# Patient Record
Sex: FEMALE | Race: OTHER | HISPANIC OR LATINO
[De-identification: names, ages, dates, MRNs, and addresses within clinical notes are randomized per-mention and may not be internally consistent; named-entity substitution may affect disease eponyms.]

---

## 2022-11-04 PROBLEM — Z00.00 ENCOUNTER FOR PREVENTIVE HEALTH EXAMINATION: Status: ACTIVE | Noted: 2022-11-04

## 2022-11-07 ENCOUNTER — APPOINTMENT (OUTPATIENT)
Dept: OBGYN | Facility: CLINIC | Age: 32
End: 2022-11-07

## 2022-11-07 VITALS
WEIGHT: 191.5 LBS | BODY MASS INDEX: 36.15 KG/M2 | HEIGHT: 61 IN | SYSTOLIC BLOOD PRESSURE: 108 MMHG | DIASTOLIC BLOOD PRESSURE: 70 MMHG

## 2022-11-07 DIAGNOSIS — Z78.9 OTHER SPECIFIED HEALTH STATUS: ICD-10-CM

## 2022-11-07 DIAGNOSIS — Z86.018 PERSONAL HISTORY OF OTHER BENIGN NEOPLASM: ICD-10-CM

## 2022-11-07 DIAGNOSIS — Z82.49 FAMILY HISTORY OF ISCHEMIC HEART DISEASE AND OTHER DISEASES OF THE CIRCULATORY SYSTEM: ICD-10-CM

## 2022-11-07 DIAGNOSIS — Z83.3 FAMILY HISTORY OF DIABETES MELLITUS: ICD-10-CM

## 2022-11-07 PROCEDURE — 99202 OFFICE O/P NEW SF 15 MIN: CPT | Mod: 25

## 2022-11-07 PROCEDURE — 81025 URINE PREGNANCY TEST: CPT

## 2022-11-07 PROCEDURE — 99385 PREV VISIT NEW AGE 18-39: CPT

## 2022-11-07 NOTE — HISTORY OF PRESENT ILLNESS
[Oral Contraceptive] : uses oral contraception pills [Y] : Patient is sexually active [Frequency: Q ___ days] : menstrual periods occur approximately every [unfilled] days [Menarche Age: ____] : age at menarche was [unfilled] [FreeTextEntry1] : 33yo  presents for pregnancy confirmation. Due for annual GYN exam\par LMP 2022 unsure of the exact date of LMP\par \par Obhx: \par -Primary c/s  2/2 abnormal FHT (nuchal x3), Male 7bls 11oz, no complications- [facility]\par  repeat c/s, 39 weeks, Male 8bls 11oz, no complications- [facility]\par \par GYNhx: fibriod\par \par Desires BTL with this repeat C/S\par  [PGHxTotal] : 3 [BannerxFullTerm] : 2 [PGHxPremature] : 0 [PGHxAbortions] : 0 [[facility] HospitalxLiving] : 2 [PGHxABInduced] : 0 [PGHxABSpont] : 0 [PGHxEctopic] : 0 [PGHxMultBirths] : 0

## 2022-11-07 NOTE — PROCEDURE
[Cervical Pap Smear] : cervical Pap smear [Liquid Base] : liquid base [GC & Chlamydia via Pap] : GC & Chlamydia via Pap [Tolerated Well] : the patient tolerated the procedure well [No Complications] : there were no complications [Transvaginal OB Sonogram] : Transvaginal OB Sonogram [Intrauterine Pregnancy] : intrauterine pregnancy [Yolk Sac] : yolk sac present [Fetal Heart] : fetal heart present [Date: ___] : Date: [unfilled] [Current GA by Sonogram: ___ (wks)] : Current GA by Sonogram: [unfilled]Uwks [___ day(s)] : [unfilled] days [Transvaginal OB Sonogram WNL] : Transvaginal OB Sonogram WNL [FreeTextEntry1] : IUP at 9w6d gestation by bedside sono\par POSITIVE FHR\par \par NT sono referral given for MFM office\par

## 2022-11-07 NOTE — PLAN
[FreeTextEntry1] : PLAN:\par \par IUP at 9w6d gestation by bedside sono, positive FHR\par \par Referral given for NT sono and 1st trimester genetic screening to be done at [facility]\par \par Prenatals sent\par Miscarriage precautions discussed\par \par PAP smear w/ HPV reflex obtained\par \par RTO 2-3 weeks for NPN\par

## 2022-11-08 LAB
C TRACH RRNA SPEC QL NAA+PROBE: NOT DETECTED
HPV HIGH+LOW RISK DNA PNL CVX: NOT DETECTED
N GONORRHOEA RRNA SPEC QL NAA+PROBE: NOT DETECTED
SOURCE TP AMPLIFICATION: NORMAL

## 2022-11-14 LAB — CYTOLOGY CVX/VAG DOC THIN PREP: NORMAL

## 2022-11-16 ENCOUNTER — ASOB RESULT (OUTPATIENT)
Age: 32
End: 2022-11-16

## 2022-11-16 ENCOUNTER — APPOINTMENT (OUTPATIENT)
Dept: MATERNAL FETAL MEDICINE | Facility: CLINIC | Age: 32
End: 2022-11-16

## 2022-11-16 PROCEDURE — 99202 OFFICE O/P NEW SF 15 MIN: CPT | Mod: 95

## 2022-11-20 ENCOUNTER — NON-APPOINTMENT (OUTPATIENT)
Age: 32
End: 2022-11-20

## 2022-11-21 ENCOUNTER — NON-APPOINTMENT (OUTPATIENT)
Age: 32
End: 2022-11-21

## 2022-11-21 ENCOUNTER — APPOINTMENT (OUTPATIENT)
Dept: OBGYN | Facility: CLINIC | Age: 32
End: 2022-11-21

## 2022-11-21 VITALS
DIASTOLIC BLOOD PRESSURE: 78 MMHG | SYSTOLIC BLOOD PRESSURE: 108 MMHG | WEIGHT: 188.06 LBS | HEIGHT: 61 IN | BODY MASS INDEX: 35.5 KG/M2

## 2022-11-21 DIAGNOSIS — Z32.01 ENCOUNTER FOR PREGNANCY TEST, RESULT POSITIVE: ICD-10-CM

## 2022-11-21 PROCEDURE — 0500F INITIAL PRENATAL CARE VISIT: CPT

## 2022-11-21 PROCEDURE — 36415 COLL VENOUS BLD VENIPUNCTURE: CPT

## 2022-11-22 LAB
ABO + RH PNL BLD: NORMAL
ALBUMIN SERPL ELPH-MCNC: 3.8 G/DL
ALP BLD-CCNC: 70 U/L
ALT SERPL-CCNC: 10 U/L
ANION GAP SERPL CALC-SCNC: 17 MMOL/L
APPEARANCE: CLEAR
AST SERPL-CCNC: 13 U/L
BASOPHILS # BLD AUTO: 0.04 K/UL
BASOPHILS NFR BLD AUTO: 0.4 %
BILIRUB SERPL-MCNC: 0.3 MG/DL
BILIRUBIN URINE: NEGATIVE
BLD GP AB SCN SERPL QL: NORMAL
BLOOD URINE: NEGATIVE
BUN SERPL-MCNC: 9 MG/DL
CALCIUM SERPL-MCNC: 8.9 MG/DL
CHLORIDE SERPL-SCNC: 99 MMOL/L
CMV IGG SERPL QL: 5.7 U/ML
CMV IGG SERPL-IMP: POSITIVE
CMV IGM SERPL QL: <8 AU/ML
CMV IGM SERPL QL: NEGATIVE
CO2 SERPL-SCNC: 19 MMOL/L
COLOR: YELLOW
CREAT SERPL-MCNC: 0.59 MG/DL
EGFR: 123 ML/MIN/1.73M2
EOSINOPHIL # BLD AUTO: 0.13 K/UL
EOSINOPHIL NFR BLD AUTO: 1.4 %
ESTIMATED AVERAGE GLUCOSE: 105 MG/DL
GLUCOSE QUALITATIVE U: NEGATIVE
GLUCOSE SERPL-MCNC: 46 MG/DL
HBA1C MFR BLD HPLC: 5.3 %
HBV SURFACE AG SER QL: NONREACTIVE
HCT VFR BLD CALC: 41 %
HCV AB SER QL: NONREACTIVE
HCV S/CO RATIO: 0.15 S/CO
HGB BLD-MCNC: 12.6 G/DL
HIV1+2 AB SPEC QL IA.RAPID: NONREACTIVE
IMM GRANULOCYTES NFR BLD AUTO: 0.3 %
KETONES URINE: NEGATIVE
LEUKOCYTE ESTERASE URINE: NEGATIVE
LYMPHOCYTES # BLD AUTO: 2.12 K/UL
LYMPHOCYTES NFR BLD AUTO: 22.5 %
MAN DIFF?: NORMAL
MCHC RBC-ENTMCNC: 28.4 PG
MCHC RBC-ENTMCNC: 30.7 GM/DL
MCV RBC AUTO: 92.6 FL
MONOCYTES # BLD AUTO: 0.62 K/UL
MONOCYTES NFR BLD AUTO: 6.6 %
MUV AB SER-ACNC: POSITIVE
MUV IGG SER QL IA: 112 AU/ML
NEUTROPHILS # BLD AUTO: 6.5 K/UL
NEUTROPHILS NFR BLD AUTO: 68.8 %
NITRITE URINE: NEGATIVE
PH URINE: 6
PLATELET # BLD AUTO: 401 K/UL
POTASSIUM SERPL-SCNC: 4.3 MMOL/L
PROT SERPL-MCNC: 6.6 G/DL
PROTEIN URINE: NORMAL
RBC # BLD: 4.43 M/UL
RBC # FLD: 13.3 %
RUBV IGG FLD-ACNC: 2.5 INDEX
RUBV IGG SER-IMP: POSITIVE
SODIUM SERPL-SCNC: 135 MMOL/L
SPECIFIC GRAVITY URINE: 1.03
T GONDII AB SER-IMP: NEGATIVE
T GONDII AB SER-IMP: POSITIVE
T GONDII IGG SER QL: 16.9 IU/ML
T GONDII IGM SER QL: 5.1 AU/ML
T PALLIDUM AB SER QL IA: NEGATIVE
TSH SERPL-ACNC: 0.86 UIU/ML
UROBILINOGEN URINE: NORMAL
VZV AB TITR SER: POSITIVE
VZV IGG SER IF-ACNC: 1019 INDEX
WBC # FLD AUTO: 9.44 K/UL

## 2022-11-23 ENCOUNTER — APPOINTMENT (OUTPATIENT)
Dept: ANTEPARTUM | Facility: CLINIC | Age: 32
End: 2022-11-23

## 2022-11-23 ENCOUNTER — ASOB RESULT (OUTPATIENT)
Age: 32
End: 2022-11-23

## 2022-11-23 LAB
MEV IGG FLD QL IA: 16.1 AU/ML
MEV IGG+IGM SER-IMP: NORMAL

## 2022-11-23 PROCEDURE — 76813 OB US NUCHAL MEAS 1 GEST: CPT

## 2022-11-23 PROCEDURE — 36415 COLL VENOUS BLD VENIPUNCTURE: CPT

## 2022-11-28 ENCOUNTER — NON-APPOINTMENT (OUTPATIENT)
Age: 32
End: 2022-11-28

## 2022-11-28 LAB
FMR1 GENE MUT ANL BLD/T: NORMAL
HGB A MFR BLD: 97.6 %
HGB A2 MFR BLD: 2.4 %
HGB FRACT BLD-IMP: NORMAL
LEAD BLD-MCNC: <1 UG/DL
M TB IFN-G BLD-IMP: NEGATIVE
QUANTIFERON TB PLUS MITOGEN MINUS NIL: 7.39 IU/ML
QUANTIFERON TB PLUS NIL: 0.06 IU/ML
QUANTIFERON TB PLUS TB1 MINUS NIL: -0.02 IU/ML
QUANTIFERON TB PLUS TB2 MINUS NIL: -0.03 IU/ML

## 2022-11-30 LAB
ADDITIONAL US: NORMAL
AR GENE MUT ANL BLD/T: NORMAL
B19V IGG SER QL IA: 4.92 INDEX
B19V IGG+IGM SER-IMP: NORMAL
B19V IGG+IGM SER-IMP: POSITIVE
B19V IGM FLD-ACNC: 0.17 INDEX
B19V IGM SER-ACNC: NEGATIVE
COMMENTS: AFP: NORMAL
CRL SCAN TWIN B: NORMAL
CRL SCAN: NORMAL
CROWN RUMP LENGTH TWIN B: NORMAL
CROWN RUMP LENGTH: 60.8 MM
DOWN SYNDROME AGE RISK: NORMAL
DOWN SYNDROME INTERPRETATION: NORMAL
DOWN SYNDROME SCREENING RISK: NORMAL
GEST. AGE ON COLLECTION DATE: 12.4 WEEKS
HCG MOM: 0.64
HCG VALUE: 55.1 IU/ML
MATERNAL AGE AT EDD: 33.4 YR
NOTE: AFP: NORMAL
NT MOM TWIN B: NORMAL
NT TWIN B: NORMAL
NUCHAL TRANSLUCENCY (NT): 1.7 MM
NUCHAL TRANSLUCENCY MOM: 1.28
NUMBER OF FETUSES: 1
PAPP-A MOM: 2.87
PAPP-A VALUE: 2021.5 NG/ML
RACE: NORMAL
RESULTS AFP: NORMAL
SONOGRAPHER ID#: NORMAL
SUBMIT PART 2 SAMPLE USING: NORMAL
TEST RESULTS: AFP: NORMAL
TRISOMY 18 AGE RISK: NORMAL
TRISOMY 18 INTERPRETATION: NORMAL
TRISOMY 18 SCREENING RISK: NORMAL
WEIGHT AFP: 190 LBS

## 2022-12-06 LAB — CFTR MUT TESTED BLD/T: NEGATIVE

## 2022-12-13 ENCOUNTER — APPOINTMENT (OUTPATIENT)
Dept: OBGYN | Facility: CLINIC | Age: 32
End: 2022-12-13
Payer: COMMERCIAL

## 2022-12-13 VITALS
BODY MASS INDEX: 36.93 KG/M2 | HEIGHT: 61 IN | DIASTOLIC BLOOD PRESSURE: 70 MMHG | WEIGHT: 195.6 LBS | SYSTOLIC BLOOD PRESSURE: 110 MMHG

## 2022-12-13 PROCEDURE — 0502F SUBSEQUENT PRENATAL CARE: CPT

## 2022-12-13 PROCEDURE — 99213 OFFICE O/P EST LOW 20 MIN: CPT

## 2022-12-13 RX ORDER — TERCONAZOLE 4 MG/G
0.4 CREAM VAGINAL DAILY
Qty: 1 | Refills: 3 | Status: ACTIVE | COMMUNITY
Start: 2022-12-13 | End: 1900-01-01

## 2022-12-23 ENCOUNTER — APPOINTMENT (OUTPATIENT)
Dept: ANTEPARTUM | Facility: CLINIC | Age: 32
End: 2022-12-23

## 2022-12-23 PROCEDURE — 36415 COLL VENOUS BLD VENIPUNCTURE: CPT

## 2022-12-28 DIAGNOSIS — Z3A.15 15 WEEKS GESTATION OF PREGNANCY: ICD-10-CM

## 2022-12-28 DIAGNOSIS — Z3A.11 11 WEEKS GESTATION OF PREGNANCY: ICD-10-CM

## 2022-12-31 LAB
ADDITIONAL US: NORMAL
AFP MOM: 1.02
AFP VALUE: 30.4 NG/ML
COLLECTED ON 2: NORMAL
COLLECTED ON: NORMAL
CRL SCAN TWIN B: NORMAL
CRL SCAN: NORMAL
CROWN RUMP LENGTH TWIN B: NORMAL
CROWN RUMP LENGTH: 60.8 MM
DIA MOM: 0.88
DIA VALUE: 117.8 PG/ML
DOWN SYNDROME AGE RISK: NORMAL
DOWN SYNDROME INTERPRETATION: NORMAL
DOWN SYNDROME SCREENING RISK: NORMAL
FIRST TRIMESTER SAMPLE: NORMAL
GEST. AGE ON COLLECTION DATE: 12.4 WEEKS
GESTATIONAL AGE: 16.7 WEEKS
HCG MOM: 0.68
HCG VALUE: 18.9 IU/ML
INSULIN DEP DIABETES: NO
MATERNAL AGE AT EDD: 33.4 YR
NT MOM TWIN B: NORMAL
NT TWIN B: NORMAL
NUCHAL TRANSLUCENCY (NT): 1.7 MM
NUCHAL TRANSLUCENCY MOM: 1.28
NUMBER OF FETUSES: 1
OPEN SPINA BIFIDA: NORMAL
OSB INTERPRETATION: NORMAL
PAPP-A MOM: 2.87
PAPP-A VALUE: 2021.5 NG/ML
RACE: NORMAL
SECOND TRIMESTER SAMPLE: NORMAL
SEQUENTIAL 2 COMMENTS: NORMAL
SEQUENTIAL 2 NOTE: NORMAL
SEQUENTIAL 2 RESULTS: NORMAL
SEQUENTIAL 2 TEST RESULTS: NORMAL
SONOGRAPHER ID#: NORMAL
TRISOMY 18 AGE RISK: NORMAL
TRISOMY 18 INTERPRETATION: NORMAL
TRISOMY 18 SCREENING RISK: NORMAL
UE3 MOM: 1.5
UE3 VALUE: 1.55 NG/ML
WEIGHT AFP: 190 LBS
WEIGHT: 195 LBS

## 2023-01-04 ENCOUNTER — APPOINTMENT (OUTPATIENT)
Dept: OBGYN | Facility: CLINIC | Age: 33
End: 2023-01-04
Payer: MEDICAID

## 2023-01-04 VITALS
SYSTOLIC BLOOD PRESSURE: 130 MMHG | DIASTOLIC BLOOD PRESSURE: 96 MMHG | BODY MASS INDEX: 37.63 KG/M2 | WEIGHT: 199.31 LBS | HEIGHT: 61 IN

## 2023-01-04 PROCEDURE — 99213 OFFICE O/P EST LOW 20 MIN: CPT

## 2023-01-04 PROCEDURE — 0502F SUBSEQUENT PRENATAL CARE: CPT

## 2023-01-04 RX ORDER — PNV NO.95/FERROUS FUM/FOLIC AC 28MG-0.8MG
28-0.8 TABLET ORAL DAILY
Qty: 30 | Refills: 8 | Status: ACTIVE | COMMUNITY
Start: 2022-11-07 | End: 1900-01-01

## 2023-01-18 ENCOUNTER — ASOB RESULT (OUTPATIENT)
Age: 33
End: 2023-01-18

## 2023-01-18 ENCOUNTER — APPOINTMENT (OUTPATIENT)
Dept: ANTEPARTUM | Facility: CLINIC | Age: 33
End: 2023-01-18
Payer: MEDICAID

## 2023-01-18 PROCEDURE — 76817 TRANSVAGINAL US OBSTETRIC: CPT | Mod: 59

## 2023-01-18 PROCEDURE — 76811 OB US DETAILED SNGL FETUS: CPT

## 2023-01-23 ENCOUNTER — APPOINTMENT (OUTPATIENT)
Dept: OBGYN | Facility: CLINIC | Age: 33
End: 2023-01-23
Payer: MEDICAID

## 2023-01-23 ENCOUNTER — NON-APPOINTMENT (OUTPATIENT)
Age: 33
End: 2023-01-23

## 2023-01-23 VITALS
BODY MASS INDEX: 37.86 KG/M2 | WEIGHT: 200.5 LBS | HEIGHT: 61 IN | DIASTOLIC BLOOD PRESSURE: 78 MMHG | SYSTOLIC BLOOD PRESSURE: 100 MMHG

## 2023-01-23 PROCEDURE — 99213 OFFICE O/P EST LOW 20 MIN: CPT

## 2023-01-30 ENCOUNTER — APPOINTMENT (OUTPATIENT)
Dept: ANTEPARTUM | Facility: CLINIC | Age: 33
End: 2023-01-30
Payer: MEDICAID

## 2023-01-30 ENCOUNTER — ASOB RESULT (OUTPATIENT)
Age: 33
End: 2023-01-30

## 2023-01-30 PROCEDURE — 76816 OB US FOLLOW-UP PER FETUS: CPT

## 2023-02-13 DIAGNOSIS — Z34.91 ENCOUNTER FOR SUPERVISION OF NORMAL PREGNANCY, UNSPECIFIED, FIRST TRIMESTER: ICD-10-CM

## 2023-02-13 DIAGNOSIS — Z3A.18 18 WEEKS GESTATION OF PREGNANCY: ICD-10-CM

## 2023-02-13 DIAGNOSIS — Z3A.21 21 WEEKS GESTATION OF PREGNANCY: ICD-10-CM

## 2023-02-22 ENCOUNTER — APPOINTMENT (OUTPATIENT)
Dept: OBGYN | Facility: CLINIC | Age: 33
End: 2023-02-22
Payer: MEDICAID

## 2023-02-22 VITALS
SYSTOLIC BLOOD PRESSURE: 121 MMHG | HEIGHT: 61 IN | BODY MASS INDEX: 39.08 KG/M2 | WEIGHT: 207 LBS | DIASTOLIC BLOOD PRESSURE: 69 MMHG

## 2023-02-22 PROCEDURE — 0502F SUBSEQUENT PRENATAL CARE: CPT

## 2023-02-23 LAB
BASOPHILS # BLD AUTO: 0.05 K/UL
BASOPHILS NFR BLD AUTO: 0.4 %
EOSINOPHIL # BLD AUTO: 0.13 K/UL
EOSINOPHIL NFR BLD AUTO: 1.1 %
GLUCOSE 1H P 50 G GLC PO SERPL-MCNC: 79 MG/DL
HCT VFR BLD CALC: 35.8 %
HGB BLD-MCNC: 11.6 G/DL
IMM GRANULOCYTES NFR BLD AUTO: 0.8 %
LYMPHOCYTES # BLD AUTO: 2.56 K/UL
LYMPHOCYTES NFR BLD AUTO: 21 %
MAN DIFF?: NORMAL
MCHC RBC-ENTMCNC: 29.8 PG
MCHC RBC-ENTMCNC: 32.4 GM/DL
MCV RBC AUTO: 92 FL
MONOCYTES # BLD AUTO: 1.04 K/UL
MONOCYTES NFR BLD AUTO: 8.5 %
NEUTROPHILS # BLD AUTO: 8.31 K/UL
NEUTROPHILS NFR BLD AUTO: 68.2 %
PLATELET # BLD AUTO: 314 K/UL
RBC # BLD: 3.89 M/UL
RBC # FLD: 13.2 %
WBC # FLD AUTO: 12.19 K/UL

## 2023-03-16 ENCOUNTER — APPOINTMENT (OUTPATIENT)
Dept: OBGYN | Facility: CLINIC | Age: 33
End: 2023-03-16
Payer: MEDICAID

## 2023-03-16 VITALS
BODY MASS INDEX: 38.71 KG/M2 | SYSTOLIC BLOOD PRESSURE: 98 MMHG | HEIGHT: 61 IN | DIASTOLIC BLOOD PRESSURE: 64 MMHG | WEIGHT: 205 LBS

## 2023-03-16 PROCEDURE — 0502F SUBSEQUENT PRENATAL CARE: CPT

## 2023-03-27 DIAGNOSIS — Z3A.28 28 WEEKS GESTATION OF PREGNANCY: ICD-10-CM

## 2023-03-27 DIAGNOSIS — Z3A.25 25 WEEKS GESTATION OF PREGNANCY: ICD-10-CM

## 2023-03-31 ENCOUNTER — NON-APPOINTMENT (OUTPATIENT)
Age: 33
End: 2023-03-31

## 2023-03-31 ENCOUNTER — APPOINTMENT (OUTPATIENT)
Dept: OBGYN | Facility: CLINIC | Age: 33
End: 2023-03-31
Payer: MEDICAID

## 2023-03-31 VITALS
WEIGHT: 216 LBS | SYSTOLIC BLOOD PRESSURE: 110 MMHG | DIASTOLIC BLOOD PRESSURE: 68 MMHG | BODY MASS INDEX: 40.78 KG/M2 | HEIGHT: 61 IN

## 2023-03-31 PROCEDURE — 0502F SUBSEQUENT PRENATAL CARE: CPT

## 2023-04-03 ENCOUNTER — APPOINTMENT (OUTPATIENT)
Dept: ANTEPARTUM | Facility: CLINIC | Age: 33
End: 2023-04-03

## 2023-04-10 ENCOUNTER — APPOINTMENT (OUTPATIENT)
Dept: OBGYN | Facility: CLINIC | Age: 33
End: 2023-04-10
Payer: MEDICAID

## 2023-04-10 VITALS
SYSTOLIC BLOOD PRESSURE: 114 MMHG | DIASTOLIC BLOOD PRESSURE: 74 MMHG | HEIGHT: 61 IN | BODY MASS INDEX: 39.87 KG/M2 | WEIGHT: 211.19 LBS

## 2023-04-10 PROCEDURE — 0502F SUBSEQUENT PRENATAL CARE: CPT

## 2023-04-24 ENCOUNTER — APPOINTMENT (OUTPATIENT)
Dept: OBGYN | Facility: CLINIC | Age: 33
End: 2023-04-24
Payer: MEDICAID

## 2023-04-24 ENCOUNTER — NON-APPOINTMENT (OUTPATIENT)
Age: 33
End: 2023-04-24

## 2023-04-24 VITALS
DIASTOLIC BLOOD PRESSURE: 73 MMHG | HEIGHT: 61 IN | BODY MASS INDEX: 40.4 KG/M2 | SYSTOLIC BLOOD PRESSURE: 109 MMHG | WEIGHT: 214 LBS

## 2023-04-24 DIAGNOSIS — Z3A.30 30 WEEKS GESTATION OF PREGNANCY: ICD-10-CM

## 2023-04-24 DIAGNOSIS — Z3A.32 32 WEEKS GESTATION OF PREGNANCY: ICD-10-CM

## 2023-04-24 PROCEDURE — 90715 TDAP VACCINE 7 YRS/> IM: CPT

## 2023-04-24 PROCEDURE — 90471 IMMUNIZATION ADMIN: CPT

## 2023-04-24 PROCEDURE — 0502F SUBSEQUENT PRENATAL CARE: CPT

## 2023-05-02 DIAGNOSIS — Z3A.33 33 WEEKS GESTATION OF PREGNANCY: ICD-10-CM

## 2023-05-02 DIAGNOSIS — Z34.92 ENCOUNTER FOR SUPERVISION OF NORMAL PREGNANCY, UNSPECIFIED, SECOND TRIMESTER: ICD-10-CM

## 2023-05-02 LAB
BILIRUB UR QL STRIP: NORMAL
GLUCOSE UR-MCNC: 500
HCG UR QL: 0.2 EU/DL
HGB UR QL STRIP.AUTO: NORMAL
KETONES UR-MCNC: NORMAL
LEUKOCYTE ESTERASE UR QL STRIP: NORMAL
NITRITE UR QL STRIP: ABNORMAL
PH UR STRIP: 6
PROT UR STRIP-MCNC: NORMAL
SP GR UR STRIP: 1.03

## 2023-05-08 ENCOUNTER — LABORATORY RESULT (OUTPATIENT)
Age: 33
End: 2023-05-08

## 2023-05-09 ENCOUNTER — APPOINTMENT (OUTPATIENT)
Dept: OBGYN | Facility: CLINIC | Age: 33
End: 2023-05-09
Payer: MEDICAID

## 2023-05-09 VITALS
DIASTOLIC BLOOD PRESSURE: 80 MMHG | HEIGHT: 61 IN | WEIGHT: 218 LBS | BODY MASS INDEX: 41.16 KG/M2 | SYSTOLIC BLOOD PRESSURE: 110 MMHG

## 2023-05-09 PROCEDURE — 0502F SUBSEQUENT PRENATAL CARE: CPT

## 2023-05-09 PROCEDURE — 59425 ANTEPARTUM CARE ONLY: CPT

## 2023-05-10 LAB
HIV1+2 AB SPEC QL IA.RAPID: NONREACTIVE
T PALLIDUM AB SER QL IA: NEGATIVE

## 2023-05-11 DIAGNOSIS — O35.10X0 MATERNAL CARE FOR (SUSPECTED) CHROMOSOMAL ABNORMALITY IN FETUS, UNSPECIFIED, NOT APPLICABLE OR UNSPECIFIED: ICD-10-CM

## 2023-05-11 DIAGNOSIS — Z12.4 ENCOUNTER FOR SCREENING FOR MALIGNANT NEOPLASM OF CERVIX: ICD-10-CM

## 2023-05-11 DIAGNOSIS — Z87.42 PERSONAL HISTORY OF OTHER DISEASES OF THE FEMALE GENITAL TRACT: ICD-10-CM

## 2023-05-11 DIAGNOSIS — Z13.71 ENCOUNTER FOR NONPROCREATIVE SCREENING FOR GENETIC DISEASE CARRIER STATUS: ICD-10-CM

## 2023-05-11 DIAGNOSIS — Z3A.36 36 WEEKS GESTATION OF PREGNANCY: ICD-10-CM

## 2023-05-11 DIAGNOSIS — Z23 ENCOUNTER FOR IMMUNIZATION: ICD-10-CM

## 2023-05-11 LAB
GP B STREP DNA SPEC QL NAA+PROBE: DETECTED
SOURCE GBS: NORMAL

## 2023-05-17 ENCOUNTER — TRANSCRIPTION ENCOUNTER (OUTPATIENT)
Age: 33
End: 2023-05-17

## 2023-05-18 ENCOUNTER — INPATIENT (INPATIENT)
Facility: HOSPITAL | Age: 33
LOS: 2 days | Discharge: HOME | End: 2023-05-21
Attending: OBSTETRICS & GYNECOLOGY | Admitting: OBSTETRICS & GYNECOLOGY
Payer: MEDICAID

## 2023-05-18 ENCOUNTER — RESULT REVIEW (OUTPATIENT)
Age: 33
End: 2023-05-18

## 2023-05-18 ENCOUNTER — NON-APPOINTMENT (OUTPATIENT)
Age: 33
End: 2023-05-18

## 2023-05-18 ENCOUNTER — TRANSCRIPTION ENCOUNTER (OUTPATIENT)
Age: 33
End: 2023-05-18

## 2023-05-18 VITALS — TEMPERATURE: 99 F | DIASTOLIC BLOOD PRESSURE: 73 MMHG | SYSTOLIC BLOOD PRESSURE: 125 MMHG | HEART RATE: 108 BPM

## 2023-05-18 DIAGNOSIS — Z98.891 HISTORY OF UTERINE SCAR FROM PREVIOUS SURGERY: Chronic | ICD-10-CM

## 2023-05-18 DIAGNOSIS — O47.1 FALSE LABOR AT OR AFTER 37 COMPLETED WEEKS OF GESTATION: ICD-10-CM

## 2023-05-18 DIAGNOSIS — O26.893 OTHER SPECIFIED PREGNANCY RELATED CONDITIONS, THIRD TRIMESTER: ICD-10-CM

## 2023-05-18 LAB
ABO RH CONFIRMATION: SIGNIFICANT CHANGE UP
ALBUMIN SERPL ELPH-MCNC: 3.9 G/DL — SIGNIFICANT CHANGE UP (ref 3.3–5.2)
ALP SERPL-CCNC: 156 U/L — HIGH (ref 40–120)
ALT FLD-CCNC: 9 U/L — SIGNIFICANT CHANGE UP
ANION GAP SERPL CALC-SCNC: 15 MMOL/L — SIGNIFICANT CHANGE UP (ref 5–17)
APPEARANCE UR: ABNORMAL
AST SERPL-CCNC: 17 U/L — SIGNIFICANT CHANGE UP
BACTERIA # UR AUTO: ABNORMAL
BASOPHILS # BLD AUTO: 0.03 K/UL — SIGNIFICANT CHANGE UP (ref 0–0.2)
BASOPHILS NFR BLD AUTO: 0.2 % — SIGNIFICANT CHANGE UP (ref 0–2)
BILIRUB SERPL-MCNC: 0.3 MG/DL — LOW (ref 0.4–2)
BILIRUB UR-MCNC: NEGATIVE — SIGNIFICANT CHANGE UP
BLD GP AB SCN SERPL QL: SIGNIFICANT CHANGE UP
BUN SERPL-MCNC: 6.4 MG/DL — LOW (ref 8–20)
CALCIUM SERPL-MCNC: 8.8 MG/DL — SIGNIFICANT CHANGE UP (ref 8.4–10.5)
CHLORIDE SERPL-SCNC: 102 MMOL/L — SIGNIFICANT CHANGE UP (ref 96–108)
CO2 SERPL-SCNC: 19 MMOL/L — LOW (ref 22–29)
COLOR SPEC: YELLOW — SIGNIFICANT CHANGE UP
COVID-19 SPIKE DOMAIN AB INTERP: POSITIVE
COVID-19 SPIKE DOMAIN ANTIBODY RESULT: >250 U/ML — HIGH
CREAT SERPL-MCNC: 0.5 MG/DL — SIGNIFICANT CHANGE UP (ref 0.5–1.3)
DIFF PNL FLD: NEGATIVE — SIGNIFICANT CHANGE UP
EGFR: 127 ML/MIN/1.73M2 — SIGNIFICANT CHANGE UP
EOSINOPHIL # BLD AUTO: 0 K/UL — SIGNIFICANT CHANGE UP (ref 0–0.5)
EOSINOPHIL NFR BLD AUTO: 0 % — SIGNIFICANT CHANGE UP (ref 0–6)
EPI CELLS # UR: SIGNIFICANT CHANGE UP
GLUCOSE SERPL-MCNC: 94 MG/DL — SIGNIFICANT CHANGE UP (ref 70–99)
GLUCOSE UR QL: NEGATIVE MG/DL — SIGNIFICANT CHANGE UP
HCT VFR BLD CALC: 36.5 % — SIGNIFICANT CHANGE UP (ref 34.5–45)
HGB BLD-MCNC: 12.4 G/DL — SIGNIFICANT CHANGE UP (ref 11.5–15.5)
IMM GRANULOCYTES NFR BLD AUTO: 0.8 % — SIGNIFICANT CHANGE UP (ref 0–0.9)
KETONES UR-MCNC: ABNORMAL
LEUKOCYTE ESTERASE UR-ACNC: NEGATIVE — SIGNIFICANT CHANGE UP
LYMPHOCYTES # BLD AUTO: 1.1 K/UL — SIGNIFICANT CHANGE UP (ref 1–3.3)
LYMPHOCYTES # BLD AUTO: 6.7 % — LOW (ref 13–44)
MAGNESIUM SERPL-MCNC: 1.6 MG/DL — SIGNIFICANT CHANGE UP (ref 1.6–2.6)
MCHC RBC-ENTMCNC: 28.8 PG — SIGNIFICANT CHANGE UP (ref 27–34)
MCHC RBC-ENTMCNC: 34 GM/DL — SIGNIFICANT CHANGE UP (ref 32–36)
MCV RBC AUTO: 84.9 FL — SIGNIFICANT CHANGE UP (ref 80–100)
MONOCYTES # BLD AUTO: 0.72 K/UL — SIGNIFICANT CHANGE UP (ref 0–0.9)
MONOCYTES NFR BLD AUTO: 4.4 % — SIGNIFICANT CHANGE UP (ref 2–14)
NEUTROPHILS # BLD AUTO: 14.4 K/UL — HIGH (ref 1.8–7.4)
NEUTROPHILS NFR BLD AUTO: 87.9 % — HIGH (ref 43–77)
NITRITE UR-MCNC: NEGATIVE — SIGNIFICANT CHANGE UP
PH UR: 7 — SIGNIFICANT CHANGE UP (ref 5–8)
PHOSPHATE SERPL-MCNC: 3 MG/DL — SIGNIFICANT CHANGE UP (ref 2.4–4.7)
PLATELET # BLD AUTO: 298 K/UL — SIGNIFICANT CHANGE UP (ref 150–400)
POTASSIUM SERPL-MCNC: 3.8 MMOL/L — SIGNIFICANT CHANGE UP (ref 3.5–5.3)
POTASSIUM SERPL-SCNC: 3.8 MMOL/L — SIGNIFICANT CHANGE UP (ref 3.5–5.3)
PROT SERPL-MCNC: 6.7 G/DL — SIGNIFICANT CHANGE UP (ref 6.6–8.7)
PROT UR-MCNC: 30 MG/DL
RBC # BLD: 4.3 M/UL — SIGNIFICANT CHANGE UP (ref 3.8–5.2)
RBC # FLD: 13.2 % — SIGNIFICANT CHANGE UP (ref 10.3–14.5)
RBC CASTS # UR COMP ASSIST: SIGNIFICANT CHANGE UP /HPF (ref 0–4)
SARS-COV-2 IGG+IGM SERPL QL IA: >250 U/ML — HIGH
SARS-COV-2 IGG+IGM SERPL QL IA: POSITIVE
SODIUM SERPL-SCNC: 136 MMOL/L — SIGNIFICANT CHANGE UP (ref 135–145)
SP GR SPEC: 1.01 — SIGNIFICANT CHANGE UP (ref 1.01–1.02)
T PALLIDUM AB TITR SER: NEGATIVE — SIGNIFICANT CHANGE UP
UROBILINOGEN FLD QL: NEGATIVE MG/DL — SIGNIFICANT CHANGE UP
WBC # BLD: 16.38 K/UL — HIGH (ref 3.8–10.5)
WBC # FLD AUTO: 16.38 K/UL — HIGH (ref 3.8–10.5)
WBC UR QL: SIGNIFICANT CHANGE UP /HPF (ref 0–5)

## 2023-05-18 PROCEDURE — 88307 TISSUE EXAM BY PATHOLOGIST: CPT | Mod: 26

## 2023-05-18 PROCEDURE — 59510 CESAREAN DELIVERY: CPT | Mod: U7

## 2023-05-18 PROCEDURE — 88302 TISSUE EXAM BY PATHOLOGIST: CPT | Mod: 26

## 2023-05-18 RX ORDER — AZITHROMYCIN 500 MG/1
500 TABLET, FILM COATED ORAL ONCE
Refills: 0 | Status: COMPLETED | OUTPATIENT
Start: 2023-05-18 | End: 2023-05-18

## 2023-05-18 RX ORDER — ENOXAPARIN SODIUM 100 MG/ML
40 INJECTION SUBCUTANEOUS EVERY 24 HOURS
Refills: 0 | Status: DISCONTINUED | OUTPATIENT
Start: 2023-05-18 | End: 2023-05-18

## 2023-05-18 RX ORDER — CITRIC ACID/SODIUM CITRATE 300-500 MG
30 SOLUTION, ORAL ORAL ONCE
Refills: 0 | Status: COMPLETED | OUTPATIENT
Start: 2023-05-18 | End: 2023-05-18

## 2023-05-18 RX ORDER — MAGNESIUM HYDROXIDE 400 MG/1
30 TABLET, CHEWABLE ORAL
Refills: 0 | Status: DISCONTINUED | OUTPATIENT
Start: 2023-05-18 | End: 2023-05-21

## 2023-05-18 RX ORDER — LANOLIN
1 OINTMENT (GRAM) TOPICAL EVERY 6 HOURS
Refills: 0 | Status: DISCONTINUED | OUTPATIENT
Start: 2023-05-18 | End: 2023-05-21

## 2023-05-18 RX ORDER — CEFAZOLIN SODIUM 1 G
2000 VIAL (EA) INJECTION ONCE
Refills: 0 | Status: COMPLETED | OUTPATIENT
Start: 2023-05-18 | End: 2023-05-18

## 2023-05-18 RX ORDER — OXYTOCIN 10 UNIT/ML
333.33 VIAL (ML) INJECTION
Qty: 20 | Refills: 0 | Status: DISCONTINUED | OUTPATIENT
Start: 2023-05-18 | End: 2023-05-21

## 2023-05-18 RX ORDER — SODIUM CHLORIDE 9 MG/ML
1000 INJECTION, SOLUTION INTRAVENOUS ONCE
Refills: 0 | Status: COMPLETED | OUTPATIENT
Start: 2023-05-18 | End: 2023-05-18

## 2023-05-18 RX ORDER — DIPHENHYDRAMINE HCL 50 MG
25 CAPSULE ORAL EVERY 6 HOURS
Refills: 0 | Status: DISCONTINUED | OUTPATIENT
Start: 2023-05-18 | End: 2023-05-21

## 2023-05-18 RX ORDER — ACETAMINOPHEN 500 MG
975 TABLET ORAL
Refills: 0 | Status: DISCONTINUED | OUTPATIENT
Start: 2023-05-18 | End: 2023-05-21

## 2023-05-18 RX ORDER — CEFAZOLIN SODIUM 1 G
2000 VIAL (EA) INJECTION ONCE
Refills: 0 | Status: DISCONTINUED | OUTPATIENT
Start: 2023-05-18 | End: 2023-05-18

## 2023-05-18 RX ORDER — IBUPROFEN 200 MG
600 TABLET ORAL EVERY 6 HOURS
Refills: 0 | Status: COMPLETED | OUTPATIENT
Start: 2023-05-18 | End: 2024-04-15

## 2023-05-18 RX ORDER — ONDANSETRON 8 MG/1
4 TABLET, FILM COATED ORAL ONCE
Refills: 0 | Status: COMPLETED | OUTPATIENT
Start: 2023-05-18 | End: 2023-05-18

## 2023-05-18 RX ORDER — OXYCODONE HYDROCHLORIDE 5 MG/1
5 TABLET ORAL
Refills: 0 | Status: COMPLETED | OUTPATIENT
Start: 2023-05-18 | End: 2023-05-25

## 2023-05-18 RX ORDER — ACETAMINOPHEN 500 MG
975 TABLET ORAL ONCE
Refills: 0 | Status: COMPLETED | OUTPATIENT
Start: 2023-05-18 | End: 2023-05-18

## 2023-05-18 RX ORDER — KETOROLAC TROMETHAMINE 30 MG/ML
30 SYRINGE (ML) INJECTION EVERY 6 HOURS
Refills: 0 | Status: DISCONTINUED | OUTPATIENT
Start: 2023-05-18 | End: 2023-05-19

## 2023-05-18 RX ORDER — OXYCODONE HYDROCHLORIDE 5 MG/1
5 TABLET ORAL ONCE
Refills: 0 | Status: DISCONTINUED | OUTPATIENT
Start: 2023-05-18 | End: 2023-05-21

## 2023-05-18 RX ORDER — SODIUM CHLORIDE 9 MG/ML
1000 INJECTION, SOLUTION INTRAVENOUS
Refills: 0 | Status: DISCONTINUED | OUTPATIENT
Start: 2023-05-18 | End: 2023-05-21

## 2023-05-18 RX ORDER — TETANUS TOXOID, REDUCED DIPHTHERIA TOXOID AND ACELLULAR PERTUSSIS VACCINE, ADSORBED 5; 2.5; 8; 8; 2.5 [IU]/.5ML; [IU]/.5ML; UG/.5ML; UG/.5ML; UG/.5ML
0.5 SUSPENSION INTRAMUSCULAR ONCE
Refills: 0 | Status: DISCONTINUED | OUTPATIENT
Start: 2023-05-18 | End: 2023-05-21

## 2023-05-18 RX ORDER — ACETAMINOPHEN 500 MG
1000 TABLET ORAL ONCE
Refills: 0 | Status: DISCONTINUED | OUTPATIENT
Start: 2023-05-18 | End: 2023-05-18

## 2023-05-18 RX ORDER — SODIUM CHLORIDE 9 MG/ML
1000 INJECTION, SOLUTION INTRAVENOUS
Refills: 0 | Status: DISCONTINUED | OUTPATIENT
Start: 2023-05-18 | End: 2023-05-18

## 2023-05-18 RX ORDER — FAMOTIDINE 10 MG/ML
20 INJECTION INTRAVENOUS ONCE
Refills: 0 | Status: COMPLETED | OUTPATIENT
Start: 2023-05-18 | End: 2023-05-18

## 2023-05-18 RX ORDER — SIMETHICONE 80 MG/1
80 TABLET, CHEWABLE ORAL EVERY 4 HOURS
Refills: 0 | Status: DISCONTINUED | OUTPATIENT
Start: 2023-05-18 | End: 2023-05-21

## 2023-05-18 RX ORDER — SCOPALAMINE 1 MG/3D
1 PATCH, EXTENDED RELEASE TRANSDERMAL ONCE
Refills: 0 | Status: COMPLETED | OUTPATIENT
Start: 2023-05-18 | End: 2023-05-18

## 2023-05-18 RX ORDER — ENOXAPARIN SODIUM 100 MG/ML
60 INJECTION SUBCUTANEOUS EVERY 24 HOURS
Refills: 0 | Status: DISCONTINUED | OUTPATIENT
Start: 2023-05-18 | End: 2023-05-21

## 2023-05-18 RX ADMIN — Medication 1000 MILLIUNIT(S)/MIN: at 11:00

## 2023-05-18 RX ADMIN — ENOXAPARIN SODIUM 60 MILLIGRAM(S): 100 INJECTION SUBCUTANEOUS at 21:34

## 2023-05-18 RX ADMIN — ONDANSETRON 4 MILLIGRAM(S): 8 TABLET, FILM COATED ORAL at 08:08

## 2023-05-18 RX ADMIN — SODIUM CHLORIDE 1000 MILLILITER(S): 9 INJECTION, SOLUTION INTRAVENOUS at 08:07

## 2023-05-18 RX ADMIN — Medication 30 MILLILITER(S): at 08:45

## 2023-05-18 RX ADMIN — Medication 30 MILLIGRAM(S): at 17:16

## 2023-05-18 RX ADMIN — FAMOTIDINE 20 MILLIGRAM(S): 10 INJECTION INTRAVENOUS at 08:47

## 2023-05-18 RX ADMIN — SCOPALAMINE 1 PATCH: 1 PATCH, EXTENDED RELEASE TRANSDERMAL at 08:45

## 2023-05-18 RX ADMIN — SODIUM CHLORIDE 125 MILLILITER(S): 9 INJECTION, SOLUTION INTRAVENOUS at 08:40

## 2023-05-18 RX ADMIN — SODIUM CHLORIDE 1000 MILLILITER(S): 9 INJECTION, SOLUTION INTRAVENOUS at 10:45

## 2023-05-18 RX ADMIN — SODIUM CHLORIDE 2000 MILLILITER(S): 9 INJECTION, SOLUTION INTRAVENOUS at 08:48

## 2023-05-18 RX ADMIN — Medication 30 MILLIGRAM(S): at 09:30

## 2023-05-18 RX ADMIN — Medication 2000 MILLIGRAM(S): at 09:08

## 2023-05-18 RX ADMIN — Medication 975 MILLIGRAM(S): at 09:00

## 2023-05-18 RX ADMIN — Medication 975 MILLIGRAM(S): at 21:32

## 2023-05-18 RX ADMIN — Medication 1000 MILLIUNIT(S)/MIN: at 09:28

## 2023-05-18 RX ADMIN — Medication 30 MILLIGRAM(S): at 23:42

## 2023-05-18 RX ADMIN — Medication 975 MILLIGRAM(S): at 08:45

## 2023-05-18 RX ADMIN — Medication 30 MILLIGRAM(S): at 10:00

## 2023-05-18 RX ADMIN — AZITHROMYCIN 255 MILLIGRAM(S): 500 TABLET, FILM COATED ORAL at 09:08

## 2023-05-18 NOTE — OB PROVIDER H&P - NSHPPHYSICALEXAM_GEN_ALL_CORE
T(C): 37.0 (05-18-23 @ 07:56), Max: 37.0 (05-18-23 @ 07:56)  HR: 108 (05-18-23 @ 07:56) (108 - 108)  BP: 125/73 (05-18-23 @ 07:56) (125/73 - 125/73)    Gen: NAD, well-appearing, AAOx3   Abd: Soft, gravid  Ext: non-tender, non-edematous  SVE:  0/0/-3  Bedside sono: vertex, anterior   FHT: baseline 170, minimal variability, -accels, -decels   [place]: Uterine irritability

## 2023-05-18 NOTE — OB PROVIDER H&P - ASSESSMENT
A/P: 33y  at 39w2d GA admitted for Repeat  delivery and bilateral salpingectomy in the setting of Cat II tracing with history of prior    -Admit to L&D  -Consent  -Admission labs  - IL LR bolus  - F/u CMP, Mg/phos level   - F/u UA   - Zofran was given for nausea   -NPO, last PO intake 9PM  -IV fluids  -Fetus: Cat II tracing. Continuous toco and fetal monitoring.   -GBS: Positive, ancef order for       Discussed with Dr. Delgadillo

## 2023-05-18 NOTE — DISCHARGE NOTE OB - PATIENT PORTAL LINK FT
You can access the FollowMyHealth Patient Portal offered by [facility] by registering at the following website: [email or web address]. By joining [facility]’s FollowMyHealth portal, you will also be able to view your health information using other applications (apps) compatible with our system.

## 2023-05-18 NOTE — OB RN DELIVERY SUMMARY - NS_SEPSISRSKCALC_OBGYN_ALL_OB_FT
EOS calculated successfully. EOS Risk Factor: 0.5/1000 live births ([facility] national incidence); GA=37w2d; Temp=98.6; ROM=0.017; GBS='Positive'; Antibiotics='No antibiotics or any antibiotics < 2 hrs prior to birth'

## 2023-05-18 NOTE — OB PROVIDER DELIVERY SUMMARY - NSLOWPPHRISK_OBGYN_A_OB
[place] Pregnancy/Less than or equal to 4 previous vaginal births/No known bleeding disorder/No history of postpartum hemorrhage/No other PPH risks indicated

## 2023-05-18 NOTE — DISCHARGE NOTE OB - CARE PROVIDER_API CALL
Thomas Delgadillo)  Obstetrics and Gynecology  [address], [address]  [place] [address]  Phone: [phone]  Fax: [phone]  Follow Up Time: 1 month

## 2023-05-18 NOTE — OB PROVIDER H&P - HISTORY OF PRESENT ILLNESS
33y  at 39w2d GA by 1st trimester sono who presents to L&D for severe abdominal pain that started at 0100 this morning. She reports constant abdominal pain that radiates to her back that began in the morning previously rated a 9/10 now currently rated 10/10. She also reports nausea with 6 episodes of vomiting. She denies burning with urination and hematuria. Patient denies vaginal bleeding and leakage of fluid. She reports fetal movement at 3AM however no fetal movement noted since. Denies fevers, chills, sick contacts, chest pain, SOB, dizziness and headache. No other complaints at this time.     MARI: 2023  LMP: 2022    Prenatal course is significant for:  Fibroid uterus     POB:  G1: : pCS 2/2 NRFHT  G2: : rCS  PGYN: +fibroids, -ovarian cysts, denies STD hx, denies abnormal PAPs   PMH: Denies  PSH: CSx2  SH: Denies EtOH, tobacco and illicit drug use during this pregnancy; feels safe at home   Meds: PNVs  Allergies: NKDA

## 2023-05-18 NOTE — OB RN INTRAOPERATIVE NOTE - NSSELHIDDEN_OBGYN_ALL_OB_FT
[NS_DeliveryAttending1_OBGYN_ALL_OB_FT:[id number]],[NS_DeliveryAssist1_OBGYN_ALL_OB_FT:[id number]],[NS_DeliveryRN_OBGYN_ALL_OB_FT:[id number]==]

## 2023-05-18 NOTE — OB RN DELIVERY SUMMARY - NSSELHIDDEN_OBGYN_ALL_OB_FT
[NS_DeliveryAttending1_OBGYN_ALL_OB_FT:[id number]==],[NS_DeliveryAssist1_OBGYN_ALL_OB_FT:[id number]=],[NS_DeliveryRN_OBGYN_ALL_OB_FT:[id number]==]

## 2023-05-18 NOTE — DISCHARGE NOTE OB - CARE PLAN
1 Principal Discharge DX:	 delivery delivered  Assessment and plan of treatment:	Patient post-operatively had an uncomplicated hospital course. Her pain was well controlled. She is tolerating a regular diet. She is ambulating independently. She was able to void after removal of vicente. Labs and Vitals WNL upon discharge.  1) Please take ibuprofen and/or Tylenol as needed for pain as prescribed.  2) Nothing in the vagina for 6 weeks (including no sex, no tampons, and no douching).  3) Please call your doctor for a follow up your postpartum appointment in 2 weeks.  4) Please continue taking vitamins postpartum.   5) Please call the office sooner if you have heavy vaginal bleeding, severe abdominal pain, or fever > 100.4F.  6) You may resume regular daily activity as tolerated   Principal Discharge DX:	 delivery delivered  Assessment and plan of treatment:	Patient post-operatively had an uncomplicated hospital course. Her pain was well controlled. She is tolerating a regular diet. She is ambulating independently. She was able to void after removal of vicente. Labs and Vitals WNL upon discharge.  1) Please take ibuprofen and/or Tylenol as needed for pain as prescribed.  2) Nothing in the vagina for 6 weeks (including no sex, no tampons, and no douching)   3) Please call your doctor for a follow up your postpartum appointment in 1-2 weeks for incision check and removal of staples  4) Please continue taking vitamins postpartum.   5) Please call the office sooner if you have heavy vaginal bleeding, severe abdominal pain, or fever > 100.4F.  6) You may resume regular daily activity as tolerated

## 2023-05-18 NOTE — DISCHARGE NOTE OB - PLAN OF CARE
Patient post-operatively had an uncomplicated hospital course. Her pain was well controlled. She is tolerating a regular diet. She is ambulating independently. She was able to void after removal of vicente. Labs and Vitals WNL upon discharge.  1) Please take ibuprofen and/or Tylenol as needed for pain as prescribed.  2) Nothing in the vagina for 6 weeks (including no sex, no tampons, and no douching).  3) Please call your doctor for a follow up your postpartum appointment in 2 weeks.  4) Please continue taking vitamins postpartum.   5) Please call the office sooner if you have heavy vaginal bleeding, severe abdominal pain, or fever > 100.4F.  6) You may resume regular daily activity as tolerated Patient post-operatively had an uncomplicated hospital course. Her pain was well controlled. She is tolerating a regular diet. She is ambulating independently. She was able to void after removal of vicente. Labs and Vitals WNL upon discharge.  1) Please take ibuprofen and/or Tylenol as needed for pain as prescribed.  2) Nothing in the vagina for 6 weeks (including no sex, no tampons, and no douching)   3) Please call your doctor for a follow up your postpartum appointment in 1-2 weeks for incision check and removal of staples  4) Please continue taking vitamins postpartum.   5) Please call the office sooner if you have heavy vaginal bleeding, severe abdominal pain, or fever > 100.4F.  6) You may resume regular daily activity as tolerated

## 2023-05-18 NOTE — OB PROVIDER DELIVERY SUMMARY - NSSELHIDDEN_OBGYN_ALL_OB_FT
[NS_DeliveryAttending1_OBGYN_ALL_OB_FT:[id number]==],[NS_DeliveryAssist1_OBGYN_ALL_OB_FT:[id number]]

## 2023-05-18 NOTE — OB PROVIDER DELIVERY SUMMARY - NSPROVIDERDELIVERYNOTE_OBGYN_ALL_OB_FT
Pt taken to the OR for repeat C/S at 37w2d in setting of Cat II tracing and in labor  Spinal anesthesia.  Low transverse repeat  section was performed.  Delivered live male infant, vtx, through moderate amount of clear amniotic fluid.  No nuchal cord.  Uterus, tubes, ovaries WNL.   Hysterotomy was reapproximated with 1-monocryl suture, excellent hemostasis obtained.  Ligasure used to excise fallopian tubes  fascia was reapproximated with 1-vicryl suture, excellent hemostasis obtained.  skin closed with staples    [place] weight 3360g  Infant CPAP to NICU  APGAR 9/9.   QBL: 363  Uop: 125  IVF: 1500  No intraoperative complications  Dictation #[id number]

## 2023-05-18 NOTE — CHART NOTE - NSCHARTNOTEFT_GEN_A_CORE
Obtained written consent from patient for Nexplanon insertion. Patient informed of risks, benefits, alternatives, side effects. All questions answered.  Patient prepped with iodine, 5cc 2% lidocaine, and Nexplanon inserted into left arm in the usual fashion.  Patient palpated Nexplanon in arm.  Provided patient with reminder card for removal/replacement in three years.  Lot # [id number]

## 2023-05-18 NOTE — OB NEONATOLOGY/PEDIATRICIAN DELIVERY SUMMARY - NSPEDSNEONOTESA_OBGYN_ALL_OB_FT
Called to delivery by Dr. Delgadillo for fetal tachycardia/NRFHT. Baby Boy Justina born at 37.2 via repeat C/S to a 34yo  O+, Hep B neg, HIV NR, RPR NR, Rubella Imm, GBS positive mother. No significant maternal hx or prenatal complications. ROM at delivery, clear fluid. Baby emerged vigorous and crying. Delayed cord clamping x30s. Warmed, dried, suctioned, stimulated. At ~5 minutes of life, began grunting with retractions. CPAP 5/21 started and given for ~15 minutes with mild improvement. Not able to tolerate RA. Of note on PE, left sided UE bruising. Will admit to NICU for respiratory failure. Apgar 9/9.

## 2023-05-18 NOTE — OB RN DELIVERY SUMMARY - BABY A: APGAR 1 MIN COLOR, DELIVERY
(1) body pink, extremities blue Cantharidin Counseling: Calcipotriene Counseling:  I discussed with the patient the risks of calcipotriene including but not limited to erythema, scaling, itching, and irritation.

## 2023-05-18 NOTE — OB PROVIDER DELIVERY SUMMARY - NS_DELIVERYATTENDING1_OBGYN_ALL_OB_FT
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  Plan: see #1 Thomas Delgadillo MD

## 2023-05-18 NOTE — DISCHARGE NOTE OB - NS MD DC FALL RISK RISK
For information on Fall & Injury Prevention, visit: [email or web address] OR  [email or web address] OR  https://www.cdc.gov/steadi/patient.html

## 2023-05-19 LAB
BASOPHILS # BLD AUTO: 0.03 K/UL — SIGNIFICANT CHANGE UP (ref 0–0.2)
BASOPHILS NFR BLD AUTO: 0.2 % — SIGNIFICANT CHANGE UP (ref 0–2)
EOSINOPHIL # BLD AUTO: 0.04 K/UL — SIGNIFICANT CHANGE UP (ref 0–0.5)
EOSINOPHIL NFR BLD AUTO: 0.3 % — SIGNIFICANT CHANGE UP (ref 0–6)
HCT VFR BLD CALC: 30.8 % — LOW (ref 34.5–45)
HGB BLD-MCNC: 10 G/DL — LOW (ref 11.5–15.5)
IMM GRANULOCYTES NFR BLD AUTO: 0.6 % — SIGNIFICANT CHANGE UP (ref 0–0.9)
LYMPHOCYTES # BLD AUTO: 1.79 K/UL — SIGNIFICANT CHANGE UP (ref 1–3.3)
LYMPHOCYTES # BLD AUTO: 12.2 % — LOW (ref 13–44)
MCHC RBC-ENTMCNC: 28.2 PG — SIGNIFICANT CHANGE UP (ref 27–34)
MCHC RBC-ENTMCNC: 32.5 GM/DL — SIGNIFICANT CHANGE UP (ref 32–36)
MCV RBC AUTO: 87 FL — SIGNIFICANT CHANGE UP (ref 80–100)
MONOCYTES # BLD AUTO: 1.34 K/UL — HIGH (ref 0–0.9)
MONOCYTES NFR BLD AUTO: 9.1 % — SIGNIFICANT CHANGE UP (ref 2–14)
NEUTROPHILS # BLD AUTO: 11.37 K/UL — HIGH (ref 1.8–7.4)
NEUTROPHILS NFR BLD AUTO: 77.6 % — HIGH (ref 43–77)
PLATELET # BLD AUTO: 249 K/UL — SIGNIFICANT CHANGE UP (ref 150–400)
RBC # BLD: 3.54 M/UL — LOW (ref 3.8–5.2)
RBC # FLD: 12.8 % — SIGNIFICANT CHANGE UP (ref 10.3–14.5)
WBC # BLD: 14.66 K/UL — HIGH (ref 3.8–10.5)
WBC # FLD AUTO: 14.66 K/UL — HIGH (ref 3.8–10.5)

## 2023-05-19 RX ORDER — OXYCODONE HYDROCHLORIDE 5 MG/1
5 TABLET ORAL
Refills: 0 | Status: DISCONTINUED | OUTPATIENT
Start: 2023-05-19 | End: 2023-05-21

## 2023-05-19 RX ORDER — IBUPROFEN 200 MG
600 TABLET ORAL EVERY 6 HOURS
Refills: 0 | Status: DISCONTINUED | OUTPATIENT
Start: 2023-05-19 | End: 2023-05-21

## 2023-05-19 RX ADMIN — ENOXAPARIN SODIUM 60 MILLIGRAM(S): 100 INJECTION SUBCUTANEOUS at 21:08

## 2023-05-19 RX ADMIN — Medication 975 MILLIGRAM(S): at 03:45

## 2023-05-19 RX ADMIN — SIMETHICONE 80 MILLIGRAM(S): 80 TABLET, CHEWABLE ORAL at 15:06

## 2023-05-19 RX ADMIN — Medication 975 MILLIGRAM(S): at 15:06

## 2023-05-19 RX ADMIN — OXYCODONE HYDROCHLORIDE 5 MILLIGRAM(S): 5 TABLET ORAL at 11:33

## 2023-05-19 RX ADMIN — Medication 600 MILLIGRAM(S): at 11:59

## 2023-05-19 RX ADMIN — Medication 600 MILLIGRAM(S): at 23:09

## 2023-05-19 RX ADMIN — Medication 600 MILLIGRAM(S): at 17:39

## 2023-05-19 RX ADMIN — Medication 975 MILLIGRAM(S): at 21:08

## 2023-05-19 RX ADMIN — SCOPALAMINE 1 PATCH: 1 PATCH, EXTENDED RELEASE TRANSDERMAL at 06:08

## 2023-05-19 RX ADMIN — OXYCODONE HYDROCHLORIDE 5 MILLIGRAM(S): 5 TABLET ORAL at 14:01

## 2023-05-19 RX ADMIN — Medication 30 MILLIGRAM(S): at 05:27

## 2023-05-19 RX ADMIN — OXYCODONE HYDROCHLORIDE 5 MILLIGRAM(S): 5 TABLET ORAL at 14:33

## 2023-05-19 RX ADMIN — OXYCODONE HYDROCHLORIDE 5 MILLIGRAM(S): 5 TABLET ORAL at 10:44

## 2023-05-19 RX ADMIN — Medication 975 MILLIGRAM(S): at 08:41

## 2023-05-19 RX ADMIN — SCOPALAMINE 1 PATCH: 1 PATCH, EXTENDED RELEASE TRANSDERMAL at 07:17

## 2023-05-19 NOTE — PROGRESS NOTE ADULT - ATTENDING COMMENTS
33y  POD1 s/p rCS @ 39w2d for fetal tachycardia and labor. Pt with some pain. Alejandra some diet, + amb, + void, mild lochia  HR: 99 (19 May 2023 04:50) (79 - 108)  BP: 100/68 (19 May 2023 04:50) (89/51 - 125/78)  Incisin c/d/I with staples  POD#1 s/p R c/s - doing well  - pain control  - encourage amb  - diet as alejandra  - f/u am CBC   - male infant in NICU - desires circ - waiting clearance   - routine PO care    Ivory Salcedo MD

## 2023-05-20 RX ORDER — IBUPROFEN 200 MG
1 TABLET ORAL
Qty: 28 | Refills: 0
Start: 2023-05-20 | End: 2023-05-26

## 2023-05-20 RX ORDER — ACETAMINOPHEN 500 MG
1 TABLET ORAL
Qty: 56 | Refills: 0
Start: 2023-05-20 | End: 2023-06-02

## 2023-05-20 RX ADMIN — Medication 975 MILLIGRAM(S): at 02:21

## 2023-05-20 RX ADMIN — Medication 600 MILLIGRAM(S): at 12:27

## 2023-05-20 RX ADMIN — Medication 975 MILLIGRAM(S): at 20:40

## 2023-05-20 RX ADMIN — Medication 600 MILLIGRAM(S): at 18:08

## 2023-05-20 RX ADMIN — Medication 600 MILLIGRAM(S): at 23:13

## 2023-05-20 RX ADMIN — Medication 975 MILLIGRAM(S): at 09:24

## 2023-05-20 RX ADMIN — ENOXAPARIN SODIUM 60 MILLIGRAM(S): 100 INJECTION SUBCUTANEOUS at 21:55

## 2023-05-20 RX ADMIN — Medication 975 MILLIGRAM(S): at 15:07

## 2023-05-20 RX ADMIN — Medication 600 MILLIGRAM(S): at 05:15

## 2023-05-20 NOTE — PROGRESS NOTE ADULT - ATTENDING COMMENTS
33y  POD2 s/p rCS @ 39w2d for fetal tachycardia and labor  - currently with minimal bleeding/lochia only, no symptoms of anemia, post partum Hgb 10.0, consistent with acute blood loss anemia, plan for PNV's  - maternal tachycardia noted in post partum period, consistent with antepartum HR baseline, WBC within expected range post op, afebrile during entire post partum period, no subjective or objective clinical symptoms/findings consistent with infection at this time  - healthy male infant at bedside, desires circumcision  - vital signs, lab results, and physical exam reassuring   - DVT PPX: ambulation  - anticipated discharge: likely tomorrow 33y  POD2 s/p rCS @ 39w2d for fetal tachycardia and labor  - currently with minimal bleeding/lochia only, no symptoms of anemia, post partum Hgb 10.0, consistent with acute blood loss anemia, plan for PNV's  - maternal tachycardia noted in post partum period, consistent with antepartum HR baseline, WBC within expected range post op, afebrile during entire post partum period, no subjective or objective clinical symptoms/findings consistent with infection at this time  - healthy male infant at bedside, desires circumcision  - vital signs, lab results, and physical exam reassuring   - DVT PPX: ambulation  - anticipated discharge: today or tomorrow

## 2023-05-21 VITALS
DIASTOLIC BLOOD PRESSURE: 75 MMHG | HEART RATE: 97 BPM | SYSTOLIC BLOOD PRESSURE: 114 MMHG | OXYGEN SATURATION: 99 % | RESPIRATION RATE: 18 BRPM | TEMPERATURE: 98 F

## 2023-05-21 PROCEDURE — 80053 COMPREHEN METABOLIC PANEL: CPT

## 2023-05-21 PROCEDURE — 36415 COLL VENOUS BLD VENIPUNCTURE: CPT

## 2023-05-21 PROCEDURE — 86901 BLOOD TYPING SEROLOGIC RH(D): CPT

## 2023-05-21 PROCEDURE — 86780 TREPONEMA PALLIDUM: CPT

## 2023-05-21 PROCEDURE — 88302 TISSUE EXAM BY PATHOLOGIST: CPT

## 2023-05-21 PROCEDURE — 86850 RBC ANTIBODY SCREEN: CPT

## 2023-05-21 PROCEDURE — 59050 FETAL MONITOR W/REPORT: CPT

## 2023-05-21 PROCEDURE — 84100 ASSAY OF PHOSPHORUS: CPT

## 2023-05-21 PROCEDURE — 86769 SARS-COV-2 COVID-19 ANTIBODY: CPT

## 2023-05-21 PROCEDURE — 86900 BLOOD TYPING SEROLOGIC ABO: CPT

## 2023-05-21 PROCEDURE — 90707 MMR VACCINE SC: CPT

## 2023-05-21 PROCEDURE — 83735 ASSAY OF MAGNESIUM: CPT

## 2023-05-21 PROCEDURE — 88307 TISSUE EXAM BY PATHOLOGIST: CPT

## 2023-05-21 PROCEDURE — 81001 URINALYSIS AUTO W/SCOPE: CPT

## 2023-05-21 PROCEDURE — 85025 COMPLETE CBC W/AUTO DIFF WBC: CPT

## 2023-05-21 PROCEDURE — T1013: CPT

## 2023-05-21 RX ADMIN — Medication 975 MILLIGRAM(S): at 02:04

## 2023-05-21 RX ADMIN — Medication 975 MILLIGRAM(S): at 08:45

## 2023-05-21 RX ADMIN — Medication 975 MILLIGRAM(S): at 15:30

## 2023-05-21 RX ADMIN — Medication 600 MILLIGRAM(S): at 12:19

## 2023-05-21 RX ADMIN — Medication 600 MILLIGRAM(S): at 05:17

## 2023-05-21 RX ADMIN — Medication 0.5 MILLILITER(S): at 15:31

## 2023-05-21 NOTE — PROGRESS NOTE ADULT - SUBJECTIVE AND OBJECTIVE BOX
HAYDEE DUNBAR is a 33y  POD2 s/p rCS @ 39w2d for fetal tachycardia and labor    S:    No acute events overnight.   The patient has no complaints.  Pain controlled with current treatment regimen.   She is ambulating without difficulty and tolerating PO.   + flatus/-BM/+ voiding   She endorses appropriate lochia, which is decreasing.   She denies fevers, chills, nausea and vomiting.   She denies lightheadedness, dizziness, palpitations, chest pain and SOB.     O:    T(C): 36.7 (23 @ 05:25), Max: 37.2 (23 @ 08:50)  HR: 108 (23 @ 05:25) (105 - 108)  BP: 104/63 (23 @ 05:25) (98/63 - 109/70)  RR: 18 (23 @ 05:25) (18 - 18)  SpO2: 98% (23 @ 05:25) (98% - 99%)    Gen: NAD, AOx3  Breast: Nontender, non-engorged   Abdomen:  Soft, non-tender, non-distended  Incision: Clean/dry/intact   Uterus:  Fundus firm below umbilicus  VE:  Expectant lochia  Ext:  Non-tender and non-edematous                          10.0   14.66 )-----------( 249      ( 19 May 2023 05:49 )             30.8         136  |  102  |  6.4<L>  ----------------------------<  94  3.8   |  19.0<L>  |  0.50    Ca    8.8      18 May 2023 08:13  Phos  3.0       Mg     1.6         TPro  6.7  /  Alb  3.9  /  TBili  0.3<L>  /  DBili  x   /  AST  17  /  ALT  9   /  AlkPhos  156<H>        
INTERVAL HPI/OVERNIGHT EVENTS:  33y Female s/p c section under spinal anesthesia with duramorph for post op analgesia on 5/18/23    Vital Signs Last 24 Hrs  T(C): 37.2 (19 May 2023 08:50), Max: 37.2 (19 May 2023 04:50)  T(F): 98.9 (19 May 2023 08:50), Max: 99 (19 May 2023 04:50)  HR: 105 (19 May 2023 08:50) (79 - 105)  BP: 98/63 (19 May 2023 08:50) (97/51 - 125/78)  BP(mean): 82 (18 May 2023 13:10) (68 - 91)  RR: 18 (19 May 2023 08:50) (14 - 20)  SpO2: 99% (19 May 2023 08:50) (97% - 100%)    Parameters below as of 19 May 2023 08:50  Patient On (Oxygen Delivery Method): room air            Patient's overall anesthesia satisfaction: Positive    Patients pain is well controlled with IT duramorph    No respiratory events overnight    No pruritis at this time    Patient doing well     No headache , urinating, + flatus    No residual numbness or weakness, sensory and motor function intact.    No anesthetic complications or complaints noted or reported          .            
HAYDEE DUNBAR is a 33y  now POD#3 s/p urgent repeat  section at 39w2d gestation due to persistent category II tracing with fetal tachycardia.    S:    No acute events overnight.   The patient has no complaints.  Pain controlled with current treatment regimen.   She is ambulating without difficulty and tolerating PO.   + flatus/-BM/+ voiding   She endorses appropriate lochia, which is decreasing.   She is breastfeeding without difficulty.   She denies fevers, chills, nausea and vomiting.   She denies lightheadedness, dizziness, palpitations, chest pain, SOB, RUQ pain, blurry vision, new-onset swelling, and urinary symptoms.      Gen: NAD, AOx3  CV: RRR, S1/S2 present  Pulm: CTAB  Breast: Nontender, non-engorged   Abdomen:  Soft, non-tender, non-distended, +bowel sounds  Incision: Clean/dry/intact with staples in place   Uterus:  Fundus firm below umbilicus  VE:  Expected lochia  Ext:  Non-tender and non-edematous                
HAYDEE DUNBAR is a 33y  s/p rCS @ 39w2d for fetal tachycardia and labor  POD1    SUBJECTIVE:  No acute events overnight.  Patient has no complaints.  Pain is well controlled.  +flatus, + voiding, -BM.  Ambulating and tolerating PO.  Appropriate lochia.    OBJECTIVE:  Physical exam:  General: AOx3, NAD.  Abdomen: Soft, appropriately tender to palpitation, fundus firm.  Incision: Dressing removed revealing clean, dry and intact incision   Vaginal: expectant lochia  Ext: No DVT signs, warm extremities.    Vital Signs Last 24 Hrs  T(C): 37.2 (19 May 2023 04:50), Max: 37.2 (19 May 2023 04:50)  T(F): 99 (19 May 2023 04:50), Max: 99 (19 May 2023 04:50)  HR: 99 (19 May 2023 04:50) (79 - 108)  BP: 100/68 (19 May 2023 04:50) (89/51 - 125/78)  BP(mean): 82 (18 May 2023 13:10) (61 - 91)  RR: 18 (19 May 2023 04:50) (14 - 20)  SpO2: 100% (19 May 2023 04:50) (97% - 100%)          136  |  102  |  6.4<L>  ----------------------------<  94  3.8   |  19.0<L>  |  0.50    Ca    8.8      18 May 2023 08:13  Phos  3.0       Mg     1.6         TPro  6.7  /  Alb  3.9  /  TBili  0.3<L>  /  DBili  x   /  AST  17  /  ALT  9   /  AlkPhos  156<H>        LABS:                        12.4   16.38 )-----------( 298      ( 18 May 2023 08:13 )             36.5

## 2023-05-21 NOTE — PROGRESS NOTE ADULT - ASSESSMENT
HAYDEE DUNBAR is a 33y  now POD#3 s/p urgent repeat  section at 39w2d gestation due to persistent category II tracing with fetal tachycardia.    A/P:    -Vital signs stable  -Hgb: 12.4 -> 10.0   -Voiding, tolerating PO, bowel function nml   -Advance care as tolerated   -Continue routine postpartum and postoperative care and education  -Male infant in NICU  -Dispo: Consider for discharge today
A/P: HAYDEE DUNBAR is a 33y  s/p rCS @ 39w2d for fetal tachycardia and labor  POD1    #Routine postpartum care  - Stable, doing well postpartum  - Hgb 12.4 > pending  - Pain: well controlled, c/w current regimen  - GI: c/w regular diet, normal bowel functio  - : voiding, lochia decreasing  - DVT ppx: SCDs, ambulation encouraged, lovenox  - Healthy baby boy, circ pending  - Dispo: c/w inpatient care  
HAYDEE DUNBAR is a 33y  POD2 s/p rCS @ 39w2d for fetal tachycardia and labor    -Vital signs stable  -Hgb:12.4>10  -Voiding, tolerating PO, bowel function nml   -Advance care as tolerated   -Continue routine postpartum and postoperative care and education  -Healthy male infant, desires circumcision  -Dispo: Pt is stable, doing well and meeting all postpartum and postoperative milestones. Possible discharge to home today pending attending approval.

## 2023-05-21 NOTE — PROGRESS NOTE ADULT - ATTENDING COMMENTS
In summary, this is a 33y  POD#3 s/p repeat  section at 39w2d gestation. She reports overall feeling well and desires to go home today because she has another child at home. She reports adequate pain control. She is ambulating, voiding, tolerating regular diet and passing gas. She is pumping and breast feeding. Vital signs and labs reviewed- no vital signs documented since 5am  (?). Requested current set of vitals which were normal. On exam abdomen is soft and diffusely tender, incision c/d/i. Infant remains in NICU for now. Postoperative instructions reviewed, patient to follow up with provider in 1w for staple removal and incision check. All questions answered    Rosey De La Fuente MD

## 2023-05-23 ENCOUNTER — APPOINTMENT (OUTPATIENT)
Dept: OBGYN | Facility: CLINIC | Age: 33
End: 2023-05-23

## 2023-05-24 PROBLEM — D21.9 BENIGN NEOPLASM OF CONNECTIVE AND OTHER SOFT TISSUE, UNSPECIFIED: Chronic | Status: ACTIVE | Noted: 2023-05-18

## 2023-05-30 ENCOUNTER — APPOINTMENT (OUTPATIENT)
Dept: OBGYN | Facility: CLINIC | Age: 33
End: 2023-05-30
Payer: MEDICAID

## 2023-05-30 VITALS
HEIGHT: 61 IN | WEIGHT: 199.5 LBS | BODY MASS INDEX: 37.66 KG/M2 | DIASTOLIC BLOOD PRESSURE: 80 MMHG | SYSTOLIC BLOOD PRESSURE: 120 MMHG

## 2023-05-30 PROCEDURE — 0503F POSTPARTUM CARE VISIT: CPT

## 2023-05-30 NOTE — PHYSICAL EXAM
[Chaperone Present] : A chaperone was present in the examining room during all aspects of the physical examination [FreeTextEntry1] : Leonie [FreeTextEntry7] : Staples removed and incision is clean and dry.

## 2023-05-31 LAB — SURGICAL PATHOLOGY STUDY: SIGNIFICANT CHANGE UP

## 2023-06-03 ENCOUNTER — APPOINTMENT (OUTPATIENT)
Dept: OBGYN | Facility: HOSPITAL | Age: 33
End: 2023-06-03

## 2023-06-15 ENCOUNTER — NON-APPOINTMENT (OUTPATIENT)
Age: 33
End: 2023-06-15

## 2023-06-15 ENCOUNTER — APPOINTMENT (OUTPATIENT)
Dept: OBGYN | Facility: CLINIC | Age: 33
End: 2023-06-15
Payer: MEDICAID

## 2023-06-15 VITALS
DIASTOLIC BLOOD PRESSURE: 83 MMHG | WEIGHT: 191.8 LBS | SYSTOLIC BLOOD PRESSURE: 118 MMHG | HEIGHT: 61 IN | BODY MASS INDEX: 36.21 KG/M2

## 2023-06-15 DIAGNOSIS — N71.9 INFLAMMATORY DISEASE OF UTERUS, UNSPECIFIED: ICD-10-CM

## 2023-06-15 PROCEDURE — 99214 OFFICE O/P EST MOD 30 MIN: CPT | Mod: 24

## 2023-06-15 RX ORDER — CEPHALEXIN 500 MG/1
500 TABLET ORAL
Qty: 28 | Refills: 0 | Status: ACTIVE | COMMUNITY
Start: 2023-06-15 | End: 1900-01-01

## 2023-06-15 NOTE — PHYSICAL EXAM
[Chaperone Present] : A chaperone was present in the examining room during all aspects of the physical examination [FreeTextEntry1] : Cathy [Normal] : cervix [Discharge] : a  ~M vaginal discharge was present [Moderate] : moderate [Isaak] : yellow [Frothy] : frothy [Serous] : serous [No Bleeding] : there was no active vaginal bleeding [Tenderness] : tender [Enlarged ___ wks] : enlarged [unfilled] ~Uweeks [Uterine Adnexae] : were not tender and not enlarged

## 2023-06-19 DIAGNOSIS — B95.1 STREPTOCOCCUS, GROUP B, AS THE CAUSE OF DISEASES CLASSIFIED ELSEWHERE: ICD-10-CM

## 2023-06-19 DIAGNOSIS — Z34.93 ENCOUNTER FOR SUPERVISION OF NORMAL PREGNANCY, UNSPECIFIED, THIRD TRIMESTER: ICD-10-CM

## 2023-06-20 LAB
BILIRUB UR QL STRIP: NORMAL
BILIRUB UR QL STRIP: NORMAL
CANDIDA VAG CYTO: NOT DETECTED
G VAGINALIS+PREV SP MTYP VAG QL MICRO: DETECTED
GLUCOSE UR-MCNC: NORMAL
GLUCOSE UR-MCNC: NORMAL
HCG UR QL: 0.2 EU/DL
HCG UR QL: 0.2 EU/DL
HGB UR QL STRIP.AUTO: NORMAL
HGB UR QL STRIP.AUTO: NORMAL
KETONES UR-MCNC: NORMAL
KETONES UR-MCNC: NORMAL
LEUKOCYTE ESTERASE UR QL STRIP: NORMAL
LEUKOCYTE ESTERASE UR QL STRIP: NORMAL
NITRITE UR QL STRIP: NORMAL
NITRITE UR QL STRIP: NORMAL
PH UR STRIP: 6
PH UR STRIP: 7
PROT UR STRIP-MCNC: NORMAL
PROT UR STRIP-MCNC: NORMAL
SP GR UR STRIP: 1.02
SP GR UR STRIP: 1.03
T VAGINALIS VAG QL WET PREP: NOT DETECTED

## 2023-06-27 ENCOUNTER — APPOINTMENT (OUTPATIENT)
Dept: OBGYN | Facility: CLINIC | Age: 33
End: 2023-06-27
Payer: MEDICAID

## 2023-06-27 VITALS
SYSTOLIC BLOOD PRESSURE: 118 MMHG | WEIGHT: 189.8 LBS | HEIGHT: 61 IN | BODY MASS INDEX: 35.83 KG/M2 | DIASTOLIC BLOOD PRESSURE: 74 MMHG

## 2023-06-27 DIAGNOSIS — N89.8 OTHER SPECIFIED NONINFLAMMATORY DISORDERS OF VAGINA: ICD-10-CM

## 2023-06-27 DIAGNOSIS — Z98.890 OTHER SPECIFIED POSTPROCEDURAL STATES: ICD-10-CM

## 2023-06-27 PROCEDURE — 0503F POSTPARTUM CARE VISIT: CPT

## 2023-06-27 NOTE — PHYSICAL EXAM
[Chaperone Present] : A chaperone was present in the examining room during all aspects of the physical examination [FreeTextEntry1] : Leonie [Appropriately responsive] : appropriately responsive [Alert] : alert [No Acute Distress] : no acute distress [No Lymphadenopathy] : no lymphadenopathy [Regular Rate Rhythm] : regular rate rhythm [No Murmurs] : no murmurs [Clear to Auscultation B/L] : clear to auscultation bilaterally [Soft] : soft [Non-tender] : non-tender [Non-distended] : non-distended [No HSM] : No HSM [No Lesions] : no lesions [No Mass] : no mass [Oriented x3] : oriented x3 [Examination Of The Breasts] : a normal appearance [No Masses] : no breast masses were palpable [Labia Majora] : normal [Labia Minora] : normal [Discharge] : a  ~M vaginal discharge was present [Scant] : scant [Foul Smelling] : not foul smelling [Isaak] : yellow [Thin] : thin [Mucoid] : mucoid [Normal] : normal [Uterine Adnexae] : normal

## 2023-07-02 ENCOUNTER — TRANSCRIPTION ENCOUNTER (OUTPATIENT)
Age: 33
End: 2023-07-02

## 2023-07-03 ENCOUNTER — RESULT REVIEW (OUTPATIENT)
Age: 33
End: 2023-07-03

## 2023-07-03 ENCOUNTER — INPATIENT (INPATIENT)
Facility: HOSPITAL | Age: 33
LOS: 2 days | Discharge: HOME HEALTH | DRG: 856 | End: 2023-07-06
Attending: SURGERY | Admitting: OBSTETRICS & GYNECOLOGY
Payer: MEDICAID

## 2023-07-03 ENCOUNTER — TRANSCRIPTION ENCOUNTER (OUTPATIENT)
Age: 33
End: 2023-07-03

## 2023-07-03 VITALS
HEART RATE: 121 BPM | RESPIRATION RATE: 18 BRPM | SYSTOLIC BLOOD PRESSURE: 123 MMHG | DIASTOLIC BLOOD PRESSURE: 76 MMHG | HEIGHT: 61 IN | WEIGHT: 197.98 LBS | OXYGEN SATURATION: 98 % | TEMPERATURE: 101 F

## 2023-07-03 DIAGNOSIS — T81.49XA INFECTION FOLLOWING A PROCEDURE, OTHER SURGICAL SITE, INITIAL ENCOUNTER: ICD-10-CM

## 2023-07-03 DIAGNOSIS — Z98.891 HISTORY OF UTERINE SCAR FROM PREVIOUS SURGERY: Chronic | ICD-10-CM

## 2023-07-03 LAB
ALBUMIN SERPL ELPH-MCNC: 3.4 G/DL — SIGNIFICANT CHANGE UP (ref 3.3–5.2)
ALBUMIN SERPL ELPH-MCNC: 4 G/DL — SIGNIFICANT CHANGE UP (ref 3.3–5.2)
ALP SERPL-CCNC: 82 U/L — SIGNIFICANT CHANGE UP (ref 40–120)
ALP SERPL-CCNC: 96 U/L — SIGNIFICANT CHANGE UP (ref 40–120)
ALT FLD-CCNC: 7 U/L — SIGNIFICANT CHANGE UP
ALT FLD-CCNC: 8 U/L — SIGNIFICANT CHANGE UP
ANION GAP SERPL CALC-SCNC: 15 MMOL/L — SIGNIFICANT CHANGE UP (ref 5–17)
ANION GAP SERPL CALC-SCNC: 9 MMOL/L — SIGNIFICANT CHANGE UP (ref 5–17)
APTT BLD: 32.2 SEC — SIGNIFICANT CHANGE UP (ref 27.5–35.5)
AST SERPL-CCNC: 10 U/L — SIGNIFICANT CHANGE UP
AST SERPL-CCNC: 12 U/L — SIGNIFICANT CHANGE UP
BASOPHILS # BLD AUTO: 0.05 K/UL — SIGNIFICANT CHANGE UP (ref 0–0.2)
BASOPHILS # BLD AUTO: 0.07 K/UL — SIGNIFICANT CHANGE UP (ref 0–0.2)
BASOPHILS NFR BLD AUTO: 0.3 % — SIGNIFICANT CHANGE UP (ref 0–2)
BASOPHILS NFR BLD AUTO: 0.4 % — SIGNIFICANT CHANGE UP (ref 0–2)
BILIRUB SERPL-MCNC: 0.5 MG/DL — SIGNIFICANT CHANGE UP (ref 0.4–2)
BILIRUB SERPL-MCNC: 0.7 MG/DL — SIGNIFICANT CHANGE UP (ref 0.4–2)
BLD GP AB SCN SERPL QL: SIGNIFICANT CHANGE UP
BUN SERPL-MCNC: 6.7 MG/DL — LOW (ref 8–20)
BUN SERPL-MCNC: 9.4 MG/DL — SIGNIFICANT CHANGE UP (ref 8–20)
CALCIUM SERPL-MCNC: 8.4 MG/DL — SIGNIFICANT CHANGE UP (ref 8.4–10.5)
CALCIUM SERPL-MCNC: 8.8 MG/DL — SIGNIFICANT CHANGE UP (ref 8.4–10.5)
CHLORIDE SERPL-SCNC: 101 MMOL/L — SIGNIFICANT CHANGE UP (ref 96–108)
CHLORIDE SERPL-SCNC: 105 MMOL/L — SIGNIFICANT CHANGE UP (ref 96–108)
CO2 SERPL-SCNC: 21 MMOL/L — LOW (ref 22–29)
CO2 SERPL-SCNC: 25 MMOL/L — SIGNIFICANT CHANGE UP (ref 22–29)
CREAT SERPL-MCNC: 0.69 MG/DL — SIGNIFICANT CHANGE UP (ref 0.5–1.3)
CREAT SERPL-MCNC: 0.73 MG/DL — SIGNIFICANT CHANGE UP (ref 0.5–1.3)
EGFR: 111 ML/MIN/1.73M2 — SIGNIFICANT CHANGE UP
EGFR: 117 ML/MIN/1.73M2 — SIGNIFICANT CHANGE UP
EOSINOPHIL # BLD AUTO: 0.05 K/UL — SIGNIFICANT CHANGE UP (ref 0–0.5)
EOSINOPHIL # BLD AUTO: 0.06 K/UL — SIGNIFICANT CHANGE UP (ref 0–0.5)
EOSINOPHIL NFR BLD AUTO: 0.3 % — SIGNIFICANT CHANGE UP (ref 0–6)
EOSINOPHIL NFR BLD AUTO: 0.4 % — SIGNIFICANT CHANGE UP (ref 0–6)
GLUCOSE SERPL-MCNC: 100 MG/DL — HIGH (ref 70–99)
GLUCOSE SERPL-MCNC: 95 MG/DL — SIGNIFICANT CHANGE UP (ref 70–99)
GRAM STN FLD: SIGNIFICANT CHANGE UP
HCG SERPL-ACNC: <4 MIU/ML — SIGNIFICANT CHANGE UP
HCT VFR BLD CALC: 30.1 % — LOW (ref 34.5–45)
HCT VFR BLD CALC: 34 % — LOW (ref 34.5–45)
HGB BLD-MCNC: 11.1 G/DL — LOW (ref 11.5–15.5)
HGB BLD-MCNC: 9.7 G/DL — LOW (ref 11.5–15.5)
IMM GRANULOCYTES NFR BLD AUTO: 0.3 % — SIGNIFICANT CHANGE UP (ref 0–0.9)
IMM GRANULOCYTES NFR BLD AUTO: 0.5 % — SIGNIFICANT CHANGE UP (ref 0–0.9)
INR BLD: 1.21 RATIO — HIGH (ref 0.88–1.16)
LACTATE BLDV-MCNC: 0.8 MMOL/L — SIGNIFICANT CHANGE UP (ref 0.5–2)
LYMPHOCYTES # BLD AUTO: 15.8 % — SIGNIFICANT CHANGE UP (ref 13–44)
LYMPHOCYTES # BLD AUTO: 2.71 K/UL — SIGNIFICANT CHANGE UP (ref 1–3.3)
LYMPHOCYTES # BLD AUTO: 2.97 K/UL — SIGNIFICANT CHANGE UP (ref 1–3.3)
LYMPHOCYTES # BLD AUTO: 20.5 % — SIGNIFICANT CHANGE UP (ref 13–44)
MCHC RBC-ENTMCNC: 27.6 PG — SIGNIFICANT CHANGE UP (ref 27–34)
MCHC RBC-ENTMCNC: 27.8 PG — SIGNIFICANT CHANGE UP (ref 27–34)
MCHC RBC-ENTMCNC: 32.2 GM/DL — SIGNIFICANT CHANGE UP (ref 32–36)
MCHC RBC-ENTMCNC: 32.6 GM/DL — SIGNIFICANT CHANGE UP (ref 32–36)
MCV RBC AUTO: 85 FL — SIGNIFICANT CHANGE UP (ref 80–100)
MCV RBC AUTO: 85.5 FL — SIGNIFICANT CHANGE UP (ref 80–100)
MONOCYTES # BLD AUTO: 1.17 K/UL — HIGH (ref 0–0.9)
MONOCYTES # BLD AUTO: 1.25 K/UL — HIGH (ref 0–0.9)
MONOCYTES NFR BLD AUTO: 7.3 % — SIGNIFICANT CHANGE UP (ref 2–14)
MONOCYTES NFR BLD AUTO: 8.1 % — SIGNIFICANT CHANGE UP (ref 2–14)
NEUTROPHILS # BLD AUTO: 10.17 K/UL — HIGH (ref 1.8–7.4)
NEUTROPHILS # BLD AUTO: 13.01 K/UL — HIGH (ref 1.8–7.4)
NEUTROPHILS NFR BLD AUTO: 70.4 % — SIGNIFICANT CHANGE UP (ref 43–77)
NEUTROPHILS NFR BLD AUTO: 75.7 % — SIGNIFICANT CHANGE UP (ref 43–77)
PLATELET # BLD AUTO: 341 K/UL — SIGNIFICANT CHANGE UP (ref 150–400)
PLATELET # BLD AUTO: 412 K/UL — HIGH (ref 150–400)
POTASSIUM SERPL-MCNC: 3.6 MMOL/L — SIGNIFICANT CHANGE UP (ref 3.5–5.3)
POTASSIUM SERPL-MCNC: 3.8 MMOL/L — SIGNIFICANT CHANGE UP (ref 3.5–5.3)
POTASSIUM SERPL-SCNC: 3.6 MMOL/L — SIGNIFICANT CHANGE UP (ref 3.5–5.3)
POTASSIUM SERPL-SCNC: 3.8 MMOL/L — SIGNIFICANT CHANGE UP (ref 3.5–5.3)
PROT SERPL-MCNC: 6 G/DL — LOW (ref 6.6–8.7)
PROT SERPL-MCNC: 7.4 G/DL — SIGNIFICANT CHANGE UP (ref 6.6–8.7)
PROTHROM AB SERPL-ACNC: 14.1 SEC — HIGH (ref 10.5–13.4)
RAPID RVP RESULT: SIGNIFICANT CHANGE UP
RBC # BLD: 3.52 M/UL — LOW (ref 3.8–5.2)
RBC # BLD: 4 M/UL — SIGNIFICANT CHANGE UP (ref 3.8–5.2)
RBC # FLD: 13.6 % — SIGNIFICANT CHANGE UP (ref 10.3–14.5)
RBC # FLD: 13.7 % — SIGNIFICANT CHANGE UP (ref 10.3–14.5)
SARS-COV-2 RNA SPEC QL NAA+PROBE: SIGNIFICANT CHANGE UP
SODIUM SERPL-SCNC: 137 MMOL/L — SIGNIFICANT CHANGE UP (ref 135–145)
SODIUM SERPL-SCNC: 139 MMOL/L — SIGNIFICANT CHANGE UP (ref 135–145)
SPECIMEN SOURCE: SIGNIFICANT CHANGE UP
WBC # BLD: 14.47 K/UL — HIGH (ref 3.8–10.5)
WBC # BLD: 17.17 K/UL — HIGH (ref 3.8–10.5)
WBC # FLD AUTO: 14.47 K/UL — HIGH (ref 3.8–10.5)
WBC # FLD AUTO: 17.17 K/UL — HIGH (ref 3.8–10.5)

## 2023-07-03 PROCEDURE — 88307 TISSUE EXAM BY PATHOLOGIST: CPT | Mod: 26

## 2023-07-03 PROCEDURE — 88304 TISSUE EXAM BY PATHOLOGIST: CPT | Mod: 26

## 2023-07-03 PROCEDURE — 44960 APPENDECTOMY: CPT

## 2023-07-03 PROCEDURE — 71045 X-RAY EXAM CHEST 1 VIEW: CPT | Mod: 26

## 2023-07-03 PROCEDURE — 99285 EMERGENCY DEPT VISIT HI MDM: CPT

## 2023-07-03 PROCEDURE — 72193 CT PELVIS W/DYE: CPT | Mod: 26,MA

## 2023-07-03 PROCEDURE — 44120 REMOVAL OF SMALL INTESTINE: CPT

## 2023-07-03 PROCEDURE — 99222 1ST HOSP IP/OBS MODERATE 55: CPT

## 2023-07-03 DEVICE — STAPLER COVIDIEN ENDO GIA 60-3.8MM BLUE: Type: IMPLANTABLE DEVICE | Status: FUNCTIONAL

## 2023-07-03 DEVICE — STAPLER COVIDIEN TRI-STAPLE 45MM TAN RELOAD: Type: IMPLANTABLE DEVICE | Status: FUNCTIONAL

## 2023-07-03 DEVICE — STAPLER COVIDIEN ENDO GIA 60-3.8MM BLUE RELOAD: Type: IMPLANTABLE DEVICE | Status: FUNCTIONAL

## 2023-07-03 DEVICE — STAPLER COVIDIEN TRI-STAPLE 45MM PURPLE RELOAD: Type: IMPLANTABLE DEVICE | Status: FUNCTIONAL

## 2023-07-03 RX ORDER — SIMETHICONE 80 MG/1
80 TABLET, CHEWABLE ORAL EVERY 6 HOURS
Refills: 0 | Status: DISCONTINUED | OUTPATIENT
Start: 2023-07-03 | End: 2023-07-06

## 2023-07-03 RX ORDER — PIPERACILLIN AND TAZOBACTAM 4; .5 G/20ML; G/20ML
3.38 INJECTION, POWDER, LYOPHILIZED, FOR SOLUTION INTRAVENOUS EVERY 8 HOURS
Refills: 0 | Status: DISCONTINUED | OUTPATIENT
Start: 2023-07-03 | End: 2023-07-03

## 2023-07-03 RX ORDER — SODIUM CHLORIDE 9 MG/ML
1000 INJECTION, SOLUTION INTRAVENOUS
Refills: 0 | Status: DISCONTINUED | OUTPATIENT
Start: 2023-07-03 | End: 2023-07-04

## 2023-07-03 RX ORDER — IBUPROFEN 200 MG
400 TABLET ORAL EVERY 6 HOURS
Refills: 0 | Status: DISCONTINUED | OUTPATIENT
Start: 2023-07-03 | End: 2023-07-06

## 2023-07-03 RX ORDER — ONDANSETRON 8 MG/1
4 TABLET, FILM COATED ORAL EVERY 8 HOURS
Refills: 0 | Status: DISCONTINUED | OUTPATIENT
Start: 2023-07-03 | End: 2023-07-03

## 2023-07-03 RX ORDER — KETOROLAC TROMETHAMINE 30 MG/ML
15 SYRINGE (ML) INJECTION ONCE
Refills: 0 | Status: DISCONTINUED | OUTPATIENT
Start: 2023-07-03 | End: 2023-07-03

## 2023-07-03 RX ORDER — ONDANSETRON 8 MG/1
4 TABLET, FILM COATED ORAL ONCE
Refills: 0 | Status: DISCONTINUED | OUTPATIENT
Start: 2023-07-03 | End: 2023-07-03

## 2023-07-03 RX ORDER — SODIUM CHLORIDE 9 MG/ML
1000 INJECTION, SOLUTION INTRAVENOUS
Refills: 0 | Status: DISCONTINUED | OUTPATIENT
Start: 2023-07-03 | End: 2023-07-03

## 2023-07-03 RX ORDER — IBUPROFEN 200 MG
600 TABLET ORAL EVERY 6 HOURS
Refills: 0 | Status: DISCONTINUED | OUTPATIENT
Start: 2023-07-03 | End: 2023-07-03

## 2023-07-03 RX ORDER — ACETAMINOPHEN 500 MG
975 TABLET ORAL EVERY 6 HOURS
Refills: 0 | Status: DISCONTINUED | OUTPATIENT
Start: 2023-07-03 | End: 2023-07-03

## 2023-07-03 RX ORDER — PIPERACILLIN AND TAZOBACTAM 4; .5 G/20ML; G/20ML
3.38 INJECTION, POWDER, LYOPHILIZED, FOR SOLUTION INTRAVENOUS ONCE
Refills: 0 | Status: COMPLETED | OUTPATIENT
Start: 2023-07-03 | End: 2023-07-03

## 2023-07-03 RX ORDER — HYDROMORPHONE HYDROCHLORIDE 2 MG/ML
1 INJECTION INTRAMUSCULAR; INTRAVENOUS; SUBCUTANEOUS
Refills: 0 | Status: DISCONTINUED | OUTPATIENT
Start: 2023-07-03 | End: 2023-07-03

## 2023-07-03 RX ORDER — ENOXAPARIN SODIUM 100 MG/ML
40 INJECTION SUBCUTANEOUS EVERY 24 HOURS
Refills: 0 | Status: DISCONTINUED | OUTPATIENT
Start: 2023-07-03 | End: 2023-07-06

## 2023-07-03 RX ORDER — ACETAMINOPHEN 500 MG
975 TABLET ORAL EVERY 6 HOURS
Refills: 0 | Status: DISCONTINUED | OUTPATIENT
Start: 2023-07-03 | End: 2023-07-06

## 2023-07-03 RX ORDER — VANCOMYCIN HCL 1 G
1000 VIAL (EA) INTRAVENOUS ONCE
Refills: 0 | Status: COMPLETED | OUTPATIENT
Start: 2023-07-03 | End: 2023-07-03

## 2023-07-03 RX ORDER — SODIUM CHLORIDE 9 MG/ML
2800 INJECTION, SOLUTION INTRAVENOUS ONCE
Refills: 0 | Status: COMPLETED | OUTPATIENT
Start: 2023-07-03 | End: 2023-07-03

## 2023-07-03 RX ORDER — PIPERACILLIN AND TAZOBACTAM 4; .5 G/20ML; G/20ML
3.38 INJECTION, POWDER, LYOPHILIZED, FOR SOLUTION INTRAVENOUS EVERY 8 HOURS
Refills: 0 | Status: DISCONTINUED | OUTPATIENT
Start: 2023-07-03 | End: 2023-07-05

## 2023-07-03 RX ORDER — HYDROMORPHONE HYDROCHLORIDE 2 MG/ML
0.5 INJECTION INTRAMUSCULAR; INTRAVENOUS; SUBCUTANEOUS
Refills: 0 | Status: DISCONTINUED | OUTPATIENT
Start: 2023-07-03 | End: 2023-07-03

## 2023-07-03 RX ADMIN — PIPERACILLIN AND TAZOBACTAM 200 GRAM(S): 4; .5 INJECTION, POWDER, LYOPHILIZED, FOR SOLUTION INTRAVENOUS at 01:29

## 2023-07-03 RX ADMIN — HYDROMORPHONE HYDROCHLORIDE 0.5 MILLIGRAM(S): 2 INJECTION INTRAMUSCULAR; INTRAVENOUS; SUBCUTANEOUS at 19:01

## 2023-07-03 RX ADMIN — HYDROMORPHONE HYDROCHLORIDE 0.5 MILLIGRAM(S): 2 INJECTION INTRAMUSCULAR; INTRAVENOUS; SUBCUTANEOUS at 14:08

## 2023-07-03 RX ADMIN — HYDROMORPHONE HYDROCHLORIDE 0.5 MILLIGRAM(S): 2 INJECTION INTRAMUSCULAR; INTRAVENOUS; SUBCUTANEOUS at 14:13

## 2023-07-03 RX ADMIN — PIPERACILLIN AND TAZOBACTAM 25 GRAM(S): 4; .5 INJECTION, POWDER, LYOPHILIZED, FOR SOLUTION INTRAVENOUS at 19:26

## 2023-07-03 RX ADMIN — HYDROMORPHONE HYDROCHLORIDE 0.5 MILLIGRAM(S): 2 INJECTION INTRAMUSCULAR; INTRAVENOUS; SUBCUTANEOUS at 16:21

## 2023-07-03 RX ADMIN — SODIUM CHLORIDE 2800 MILLILITER(S): 9 INJECTION, SOLUTION INTRAVENOUS at 01:27

## 2023-07-03 RX ADMIN — Medication 400 MILLIGRAM(S): at 19:25

## 2023-07-03 RX ADMIN — Medication 975 MILLIGRAM(S): at 19:30

## 2023-07-03 RX ADMIN — Medication 250 MILLIGRAM(S): at 02:00

## 2023-07-03 RX ADMIN — SODIUM CHLORIDE 125 MILLILITER(S): 9 INJECTION, SOLUTION INTRAVENOUS at 07:52

## 2023-07-03 RX ADMIN — Medication 975 MILLIGRAM(S): at 19:03

## 2023-07-03 RX ADMIN — HYDROMORPHONE HYDROCHLORIDE 0.5 MILLIGRAM(S): 2 INJECTION INTRAMUSCULAR; INTRAVENOUS; SUBCUTANEOUS at 14:00

## 2023-07-03 RX ADMIN — Medication 15 MILLIGRAM(S): at 04:00

## 2023-07-03 NOTE — CONSULT NOTE ADULT - ATTENDING COMMENTS
34 yo F s/p  on 2023 with complaints of persistent abd pain near the incision site, mostly LLQ. She states that pain got worse on last week Thursday , with associated erythema and subjective fever/chills. She denies any N&V, changes in bowel movements, diarrhea/constipation, and/or bloody BM. CT demonstrates an overlying subcutaneous fluid collection with surrounding inflammation, concern for infectious process, abscess/seroma. Also there is a concern for acute ruptured appendicitis with extension into rectus abdominus muscle, walled off appendicolith, surgery consulted for evaluation. On review of scan, based of appendix is identified and intact. Difficult to access tip of appendix in setting of post-surgical changes. She denies any pain in RLQ.  Patient WBC 17. Afebrile and HDS.   Awake alert  Bilateral BS  Hemodynamic intact  Abdomen soft, active BS, tender in LLQ where a mass is palpable consistent with abscess aminating from subfascial plane up into subcutaneous tissue. Rubor and edema of the incision. Patient has perforated appendix and most likely this occurred about a week ago and infectious process spread into the  incision.  Patient needs formal exploratory laparotomy, appendectomy and drainage of abscesses, This is not something to be attempted laparoscopically or by partial drainage.  To OR this am. Likely will require wound vac  Neurologic grossly intact

## 2023-07-03 NOTE — H&P ADULT - NSHPPHYSICALEXAM_GEN_ALL_CORE
Vital Signs Last 24 Hrs  T(C): 37.4 (03 Jul 2023 03:53), Max: 38.6 (03 Jul 2023 00:35)  T(F): 99.3 (03 Jul 2023 03:53), Max: 101.4 (03 Jul 2023 00:35)  HR: 100 (03 Jul 2023 03:53) (100 - 121)  BP: 124/71 (03 Jul 2023 03:53) (123/76 - 124/71)  BP(mean): --  RR: 16 (03 Jul 2023 03:53) (16 - 18)  SpO2: 97% (03 Jul 2023 03:53) (97% - 98%)    Parameters below as of 03 Jul 2023 03:53  Patient On (Oxygen Delivery Method): room air    PHYSICAL EXAM:  GEN: NAD, AOx3  CV: S1S2, RRR.  Pulm: CTABL, no WRR. Speaking in full sentences without shortness of breath.  Abd: Soft, Nondistended. No rebound tenderness or guarding. Bowel sounds present  Incision: no separation at incision site; erythema and induration noted superior and inferior to incision with tenderness to palpation  PELVIC: deferred

## 2023-07-03 NOTE — ED PROVIDER NOTE - CARE PLAN
Principal Discharge DX:	Post-operative wound abscess   1 Principal Discharge DX:	Ruptured appendicitis  Secondary Diagnosis:	Post-operative wound abscess

## 2023-07-03 NOTE — PROGRESS NOTE ADULT - ASSESSMENT
33y  POD#45 s/p urgent rCS admitted for abdominal wall abscess, meets sepsis criteria.     Neuro: Ordered tylenol and ibuprofen for pain  CV: No history of cardiovascular disease. Blood pressure well controlled.   Pulm: No active disease. Saturating well 97% room air.  GI: NPO, plan for OR today,   Heme: Hgb 11.1  ID: Patient was febrile in the ED, meeting sepsis criteria. WBC  17.17. On Zosyn, blood cultures pending. Plan for OR today with general surgery and Gyn.   Endo: No active disease.   FEN: IVF at 125cc/hr. Electrolytes WNL.  Skin: No active disease.   Psych: No active disease.   DVT ppx: Ambulation encouraged, SCDs when in bed.  Dispo: Continue inpatient management   33y  POD#45 s/p urgent rCS admitted for abdominal wall abscess, meets sepsis criteria.     Neuro: Tylenol and ibuprofen for pain  CV: BP and HR wnl after IVF.   Pulm: Saturating well on 97% room air.  GI: NPO, plan for OR today,   Heme: Hgb 11.1  ID: Patient was febrile in the ED, meeting sepsis criteria. WBC 17.17. On Zosyn, blood cultures pending. Plan for OR today with general surgery and Gyn.   FEN: IVF at 125cc/hr. Electrolytes WNL.  DVT ppx: Ambulation encouraged, SCDs when in bed.  Dispo: Continue inpatient management   A/P: 33y  POD#46 s/p urgent rCS admitted for abdominal wall abscess suspicious for ruptured appedicitis; patient met sepsis criteria on admission by fever and tachycardia.   Neuro: Pain well controlled. Continue current regimen.   CV: BP and HR wnl after IVF. No concerns at this time.   Pulm: Saturating well on room air.  GI: NPO, plan for OR today with general surgery for appendectomy and abdominal washout. GYN to be intraop to evaluate pelvic organs.   Heme: Hgb 11.1 > AM labs pending.   ID: Patient was febrile in the ED, meeting sepsis criteria. WBC 17.17 > AM labd pending. Continue Zosyn, blood cultures pending. Plan for OR today with general surgery and Gyn.   FEN: IVF at 125cc/hr. Replete electrolytes PRN.   DVT ppx: Ambulation encouraged, SCDs when in bed.  Dispo: Continue inpatient management, for OR today     ADDENDUM:    I have personally seen and examined the patient. I full participated in the care of this patient. I have made amendments to the documentation where necessary, and agree with the history, physical exam and plan as documented.    Wil, PGY4

## 2023-07-03 NOTE — ASU PREOP CHECKLIST - ANTIBIOTIC
zosyn and vanc given in or no preop order, ER didn't give 0930 dose of zosyn so given to or  to give to room

## 2023-07-03 NOTE — ED PROVIDER NOTE - OBJECTIVE STATEMENT
HAYDEE DUNBAR is a 32yo Female with PMH Fibroids,  who presents c/o abdominal pain. Pt state she had a  in May has had persistent pain at the site of the incision since that time. On Friday she started having severe pain and started having fevers. She noticed redness around the incision at that time. Today her fevers and pain persisted prompting her to come in to the ED. On exam pt w/ abdominal wall induration and erythema suspicious for abscess.

## 2023-07-03 NOTE — ED ADULT NURSE REASSESSMENT NOTE - NS ED NURSE REASSESS COMMENT FT1
Report given to receiving RN Lizeth in OR, awaiting transport, placed into gown, belongings in bags w/ pt, pt aware of plan of care.

## 2023-07-03 NOTE — ED ADULT NURSE NOTE - OBJECTIVE STATEMENT
Pt presenting to Emergency Department complaining of abd pain near incision site and fevers since Friday. Pt reports having  in May. Incision site dry and intact with redness. Pt placed on cardiac monitor in NSR 98 HR and  at 98% RA. Respirations even and unlabored. Skin warm to touch.

## 2023-07-03 NOTE — H&P ADULT - HISTORY OF PRESENT ILLNESS
Name: HAYDEE DUNBAR  MRN: [id number]    HAYDEE DUNBAR is a 33y  POD#45 s/p urgent repeat CS for abnormal FHT who presents to the ED with complaints of fever and abdominal pain.   She reports having pain at her  incision continuously since she left the hospital. She reports seeing Dr. Delgadillo on 6/15/23 where she was given keflex. She followed up with him on 23 and states she was told everything was okay, On , she began having fevers and noted that the skin around her incision was red. She reports today she noticed it was hard, and more painful, which prompted her to return.      In the ED patient was noted to be meeting sepsis criteria (, Temp 101.4); she received IVF and was started on zosyn.

## 2023-07-03 NOTE — PROGRESS NOTE ADULT - SUBJECTIVE AND OBJECTIVE BOX
33y  POD#45 s/p urgent rCS admitted for abdominal wall abscess, meets sepsis criteria. Pt reporting incisional pain, fever has since resolved. Last ate at 1700 last night.       Vital Signs Last 24 Hrs  T(C): 36.8 (2023 07:22), Max: 38.6 (2023 00:35)  T(F): 98.2 (2023 07:22), Max: 101.4 (2023 00:35)  HR: 82 (2023 07:22) (82 - 121)  BP: 119/82 (2023 07:22) (119/82 - 124/71)  RR: 18 (2023 07:22) (16 - 18)  SpO2: 97% (2023 07:22) (97% - 98%)      PHYSICAL EXAM:  Gen: resting comfortably in bed.   CHEST/LUNG: Breathing comfortably on room air.   ABDOMEN: soft, nondistended.  Incision erythematous, warm, and indurated superior to the midline of incision with tenderness to palpation.   EXTREMITIES:  No clubbing, cyanosis, or edema    LABS:                        11.1   17.17 )-----------( 412      ( 2023 01:00 )             34.0     07-03    137  |  101  |  9.4  ----------------------------<  95  3.6   |  21.0<L>  |  0.73    Ca    8.8      2023 01:00    TPro  7.4  /  Alb  4.0  /  TBili  0.5  /  DBili  x   /  AST  12  /  ALT  8   /  AlkPhos  96  07-03        RADIOLOGY STUDIES:  < from: CT Pelvis w/ IV Cont (23 @ 02:50) >    ACC: [id number] EXAM:  CT PELVIS ONLY IC   ORDERED BY: ALISHA BLANCHARD     PROCEDURE DATE:  2023          INTERPRETATION:  CLINICAL INFORMATION: Abdominal pain over the site of   recent  incision.    COMPARISON: None.    CONTRAST/COMPLICATIONS:  IV Contrast: Omnipaque 350  90 cc administered   10 cc discarded  Oral Contrast: NONE  Complications: None reported at time of study completion    PROCEDURE:  CT of the Pelvis was performed.  Sagittal and coronal reformats were performed.    FINDINGS:  BLADDER: Within normal limits.  REPRODUCTIVE ORGANS: Uterus and adnexa within normal limits.  LYMPH NODES: No pelvic lymphadenopathy.    VISUALIZED PORTIONS:  ABDOMINAL WALL/ABDOMINAL ORGANS: A thick-walled dilated fluid-filled   appendix extends through to the rectus abdominis muscle where there is a   2.6 x 0.9 cm peripherally enhancing fluid collection containing an   internal 8mm calcification, likely an appendicolith (9:81, 8:33). A 6.1 x   2.7 cm subcutaneous fluid collection with surrounding infiltration is   noted.  BOWEL: Within normal limits.  PERITONEUM: No ascites.  VESSELS: Within normal limits.  BONES: Within normal limits.    IMPRESSION:  Findings suggestive of acute ruptured appendicitis with extension into   the rectus abdominis muscle where there is a partially peripherally   walled off fluid collection containing an appendicolith.    Overlying subcutaneous fluid collection and surrounding inflammation may   represent seroma, however, superimposed infection/abscess are in the   differential.        --- End of Report ---      RHONDA OSBORN MD; Attending Radiologist  This document has been electronically signed. Jul  3 2023  4:08AM   GYN PROGRESS NOTE     SUBJECTIVE:  Pt reporting incisional pain, fever has since resolved. Last ate at 1700 last night.     OBJECTIVE:  Vital Signs Last 24 Hrs  T(C): 36.8 (2023 07:22), Max: 38.6 (2023 00:35)  T(F): 98.2 (2023 07:22), Max: 101.4 (2023 00:35)  HR: 82 (2023 07:22) (82 - 121)  BP: 119/82 (2023 07:22) (119/82 - 124/71)  RR: 18 (2023 07:22) (16 - 18)  SpO2: 97% (2023 07:22) (97% - 98%)    PHYSICAL EXAM:  Gen: resting comfortably in bed.   CHEST/LUNG: Breathing comfortably on room air.   ABDOMEN: soft, nondistended.  INCISION: erythematous, warm, and indurated superior to the midline of incision with tenderness to palpation.   EXTREMITIES:  No clubbing, cyanosis, or edema    LABS:                        11.1   17.17 )-----------( 412      ( 2023 01:00 )             34.0     07-03    137  |  101  |  9.4  ----------------------------<  95  3.6   |  21.0<L>  |  0.73    Ca    8.8      2023 01:00    TPro  7.4  /  Alb  4.0  /  TBili  0.5  /  DBili  x   /  AST  12  /  ALT  8   /  AlkPhos  96  07-03        RADIOLOGY STUDIES:  < from: CT Pelvis w/ IV Cont (23 @ 02:50) >    ACC: [id number] EXAM:  CT PELVIS ONLY IC   ORDERED BY: ALISHA BLANCHARD     PROCEDURE DATE:  2023          INTERPRETATION:  CLINICAL INFORMATION: Abdominal pain over the site of   recent  incision.    COMPARISON: None.    CONTRAST/COMPLICATIONS:  IV Contrast: Omnipaque 350  90 cc administered   10 cc discarded  Oral Contrast: NONE  Complications: None reported at time of study completion    PROCEDURE:  CT of the Pelvis was performed.  Sagittal and coronal reformats were performed.    FINDINGS:  BLADDER: Within normal limits.  REPRODUCTIVE ORGANS: Uterus and adnexa within normal limits.  LYMPH NODES: No pelvic lymphadenopathy.    VISUALIZED PORTIONS:  ABDOMINAL WALL/ABDOMINAL ORGANS: A thick-walled dilated fluid-filled   appendix extends through to the rectus abdominis muscle where there is a   2.6 x 0.9 cm peripherally enhancing fluid collection containing an   internal 8mm calcification, likely an appendicolith (9:81, 8:33). A 6.1 x   2.7 cm subcutaneous fluid collection with surrounding infiltration is   noted.  BOWEL: Within normal limits.  PERITONEUM: No ascites.  VESSELS: Within normal limits.  BONES: Within normal limits.    IMPRESSION:  Findings suggestive of acute ruptured appendicitis with extension into   the rectus abdominis muscle where there is a partially peripherally   walled off fluid collection containing an appendicolith.    Overlying subcutaneous fluid collection and surrounding inflammation may   represent seroma, however, superimposed infection/abscess are in the   differential.        --- End of Report ---      RHONDA OSBORN MD; Attending Radiologist  This document has been electronically signed. Jul  3 2023  4:08AM

## 2023-07-03 NOTE — H&P ADULT - NSHPREVIEWOFSYSTEMS_GEN_ALL_CORE
REVIEW OF SYSTEMS:  CONSTITUTIONAL: No weakness, + fever  EYES/ENT: No visual changes;  No throat pain   NECK: No pain or stiffness  RESPIRATORY: No cough, wheezing; No shortness of breath  CARDIOVASCULAR: No chest pain or palpitations  GASTROINTESTINAL: + abdominal  pain. No nausea, vomiting, or hematemesis  GENITOURINARY: No dysuria, frequency or hematuria  NEUROLOGICAL: No stroke like symptoms  SKIN: No rashes

## 2023-07-03 NOTE — CHART NOTE - NSCHARTNOTEFT_GEN_A_CORE
Patient seen and examined this am. All questions answered. Will proceed to OR this am for reopening recent laparotomy, abdominal washout/debridement, possible appendectomy. Discussed with OB team, who will join. Consent obtained.

## 2023-07-03 NOTE — ED ADULT NURSE REASSESSMENT NOTE - NS ED NURSE REASSESS COMMENT FT1
Assuming care from previous RN Emir, pt AOx4, denies SOB, resp even and unlabored, skin warm and dry, color good, denies pain/n/v, patent 20G IV in left AC, showing NSR on monitor, OBGYN team saw pt, going to OR today, pt aware of plan of care.

## 2023-07-03 NOTE — ED PROVIDER NOTE - CLINICAL SUMMARY MEDICAL DECISION MAKING FREE TEXT BOX
ASSESSMENT:   HAYDEE DUNBAR is a 32yo F who presented with sepsis and likely abdominal wall abscess s/p . Febrile and tachycardic but mainating blood pressure.      PLAN: Antipyretics and antibiotics. Fluids. CT imaging. Draw cultures, sepsis work-up. Reassess.

## 2023-07-03 NOTE — ED PROVIDER NOTE - PROGRESS NOTE DETAILS
Fabio: Imaging reviewed. Discussed with GYN team; to be admitted to GYN service. Fabio: Pt initially admitted to GYN service; however surgery consulted after review of official CT read. Fabio: Pt initially admitted to GYN service; however surgery consulted after review of official CT read concerning for possible ruptured appendicitis. Fabio: Spoke with surgery team -- they will follow in consultation but have lower clinical suspicion for acute appendicitis given that pt's symptoms are more in the LLQ compared to RLQ. Spoke with GYN team for admission.

## 2023-07-03 NOTE — H&P ADULT - ASSESSMENT
A/P: HAYDEE DUNBAR is a 33y  POD#45 s/p urgent repeat CS for abnormal FHT who presents to the ED with complaints of fever and abdominal pain found to have an abdominal abscess and appendicitis.       Neuro: Ordered to tylenol and ibuprofen to pain  CV: No history of cardiovascular disease. Blood pressure well controlled. HR improved s/p IVF.  Pulm: No active disease. Saturating well 97% room air. In  GI: No active disease. Bowel sounds and function normal, tolerating PO diet. Continue current bowel regimen.   Heme: Hgb11.1 -  ID: Patient was febrile in the ED, meeting sepsis criteria. WBC  17.17.  Endo: No active disease.   FEN: IVF at 125cc/hr. Electrolytes WNL.  Skin: No active disease.   Psych: No active disease.   DVT ppx: Ambulation encouraged, SCDs when in bed.  Dispo: Continue inpatient management A/P: HAYDEE DUNBAR is a 33y  POD#45 s/p urgent repeat CS for abnormal FHT who presents to the ED with complaints of fever and abdominal pain found to have an abdominal abscess and appendicitis.       Neuro: Ordered to tylenol and ibuprofen to pain  CV: No history of cardiovascular disease. Blood pressure well controlled. HR improved s/p IVF.  Pulm: No active disease. Saturating well 97% room air. In  GI: No active disease. Bowel sounds and function normal, tolerating PO diet. Continue current bowel regimen.   Heme: Hgb11.1 -  ID: Patient was febrile in the ED, meeting sepsis criteria. WBC  17.17.  Endo: No active disease.   FEN: IVF at 125cc/hr. Electrolytes WNL.  Skin: No active disease.   Psych: No active disease.   DVT ppx: Ambulation encouraged, SCDs when in bed.  Dispo: Continue inpatient management    Addendum:    Subjective Hx and Physical Exam reviewed.  I agree with the Resident Physician's assessment and plan of care, as discussed above.  R/B/A of admission, including medications and options for pain management.  She has no Judaism or personal objection to blood transfusion, if necessary.  She was given the opportunity to ask questions and all were addressed.  She understands the plan of care.    Christiano Franco, DO

## 2023-07-03 NOTE — CONSULT NOTE ADULT - SUBJECTIVE AND OBJECTIVE BOX
Surgery Consult    Consulting attending: Dr. Daniels       HPI: 34 yo F s/p  on  with complaints of peristent abd pain near the incision site, mostly LLQ. She states that pain got worse on last week Thursday , with associated erythema and subjective fever/chills. She denies any N&V, changes in bowel movements, diarrhea/constipation, and/or bloody BM. CT demonstrates       PAST MEDICAL HISTORY:  Fibroids          PAST SURGICAL HISTORY:  H/O  section          MEDICATIONS:        ALLERGIES:  No Known Allergies        VITALS & I/Os:  Vital Signs Last 24 Hrs  T(C): 37.4 (2023 03:53), Max: 38.6 (2023 00:35)  T(F): 99.3 (2023 03:53), Max: 101.4 (2023 00:35)  HR: 100 (2023 03:53) (100 - 121)  BP: 124/71 (2023 03:53) (123/76 - 124/71)  BP(mean): --  RR: 16 (2023 03:53) (16 - 18)  SpO2: 97% (2023 03:53) (97% - 98%)    Parameters below as of 2023 03:53  Patient On (Oxygen Delivery Method): room air        I&O's Summary        PHYSICAL EXAM:  Constitutional: patient resting comfortably in bed, in no acute distress  HEENT: EOMI / PERRL b/l, no active drainage or redness  Neck: No JVD, full ROM without pain  Respiratory: respirations are unlabored, no accessory muscle use, no conversational dyspnea  Cardiovascular: regular rate & rhythm  Gastrointestinal: Abdomen soft, non-tender, non-distended, no rebound tenderness / guarding  Neurological: GCS: 15 (4/5/6). A&O x 3; no gross sensory / motor / coordination deficits  Psychiatric: Normal mood, normal affect  Musculoskeletal: No joint pain, swelling or deformity; no limitation of movement  Vascular:        LABS:                        11.1   17.17 )-----------( 412      ( 2023 01:00 )             34.0     07-03    137  |  101  |  9.4  ----------------------------<  95  3.6   |  21.0<L>  |  0.73    Ca    8.8      2023 01:00    TPro  7.4  /  Alb  4.0  /  TBili  0.5  /  DBili  x   /  AST  12  /  ALT  8   /  AlkPhos  96      Lactate:   @ 01:00  0.80    PT/INR - ( 2023 01:00 )   PT: 14.1 sec;   INR: 1.21 ratio         PTT - ( 2023 01:00 )  PTT:32.2 sec          Urinalysis Basic - ( 2023 01:00 )    Color: x / Appearance: x / SG: x / pH: x  Gluc: 95 mg/dL / Ketone: x  / Bili: x / Urobili: x   Blood: x / Protein: x / Nitrite: x   Leuk Esterase: x / RBC: x / WBC x   Sq Epi: x / Non Sq Epi: x / Bacteria: x          IMAGING:  < from: CT Pelvis w/ IV Cont (23 @ 02:50) >  FINDINGS:  BLADDER: Within normal limits.  REPRODUCTIVE ORGANS: Uterus and adnexa within normal limits.  LYMPH NODES: No pelvic lymphadenopathy.    VISUALIZED PORTIONS:  ABDOMINAL WALL/ABDOMINAL ORGANS: A thick-walled dilated fluid-filled   appendix extends through to the rectus abdominis muscle where there is a   2.6 x 0.9 cm peripherally enhancing fluid collection containing an   internal 8mm calcification, likely an appendicolith (9:81, 8:33). A 6.1 x   2.7 cm subcutaneous fluid collection with surrounding infiltration is   noted.  BOWEL: Within normal limits.  PERITONEUM: No ascites.  VESSELS: Within normal limits.  BONES: Within normal limits.    IMPRESSION:  Findings suggestive of acute ruptured appendicitis with extension into   the rectus abdominis muscle where there is a partially peripherally   walled off fluid collection containing an appendicolith.    Overlying subcutaneous fluid collection and surrounding inflammation may   represent seroma, however, superimposed infection/abscess are in the   differential.                                                                                    Surgery Consult    Consulting attending: Dr. Daniels       HPI: 34 yo F s/p  on 2023 with complaints of peristent abd pain near the incision site, mostly LLQ. She states that pain got worse on last week Thursday , with associated erythema and subjective fever/chills. She denies any N&V, changes in bowel movements, diarrhea/constipation, and/or bloody BM. CT demonstrates an overlying subcutaneous fluid collection with surrounding inflammation, concern for infectious process, abscess/seroma. Also there is a concern for acute ruptured appendicitis with extension into rectus abdominus muscle , ? walled off appendicolith, surgery consulted for evaluation. On review of scan, based of appendix is identified and intact. Difficult to access tip of appendix in setting of post-surgical changes. She denies any pain in RLQ.  Patient WBC 17. Afebrile and HDS.       PAST MEDICAL HISTORY:  Fibroids          PAST SURGICAL HISTORY:  H/O  section          MEDICATIONS:        ALLERGIES:  No Known Allergies        VITALS & I/Os:  Vital Signs Last 24 Hrs  T(C): 37.4 (2023 03:53), Max: 38.6 (2023 00:35)  T(F): 99.3 (2023 03:53), Max: 101.4 (2023 00:35)  HR: 100 (2023 03:53) (100 - 121)  BP: 124/71 (2023 03:53) (123/76 - 124/71)  BP(mean): --  RR: 16 (2023 03:53) (16 - 18)  SpO2: 97% (2023 03:53) (97% - 98%)    Parameters below as of 2023 03:53  Patient On (Oxygen Delivery Method): room air        I&O's Summary        PHYSICAL EXAM:  Constitutional: patient resting comfortably in bed, in no acute distress  Respiratory: respirations are unlabored, no accessory muscle use, no conversational dyspnea  Cardiovascular: regular rate & rhythm  Gastrointestinal: Abdomen soft, suprapubic incision, on lateral left aspect, erythema with palpable mass,  non-tender in RLQ  , non-distended, no rebound tenderness / guarding  Psychiatric: Normal mood, normal affect          LABS:                        11.1   17.17 )-----------( 412      ( 2023 01:00 )             34.0     07-03    137  |  101  |  9.4  ----------------------------<  95  3.6   |  21.0<L>  |  0.73    Ca    8.8      2023 01:00    TPro  7.4  /  Alb  4.0  /  TBili  0.5  /  DBili  x   /  AST  12  /  ALT  8   /  AlkPhos  96  -    Lactate:  -03 @ 01:00  0.80    PT/INR - ( 2023 01:00 )   PT: 14.1 sec;   INR: 1.21 ratio         PTT - ( 2023 01:00 )  PTT:32.2 sec          Urinalysis Basic - ( 2023 01:00 )    Color: x / Appearance: x / SG: x / pH: x  Gluc: 95 mg/dL / Ketone: x  / Bili: x / Urobili: x   Blood: x / Protein: x / Nitrite: x   Leuk Esterase: x / RBC: x / WBC x   Sq Epi: x / Non Sq Epi: x / Bacteria: x          IMAGING:  < from: CT Pelvis w/ IV Cont (23 @ 02:50) >  FINDINGS:  BLADDER: Within normal limits.  REPRODUCTIVE ORGANS: Uterus and adnexa within normal limits.  LYMPH NODES: No pelvic lymphadenopathy.    VISUALIZED PORTIONS:  ABDOMINAL WALL/ABDOMINAL ORGANS: A thick-walled dilated fluid-filled   appendix extends through to the rectus abdominis muscle where there is a   2.6 x 0.9 cm peripherally enhancing fluid collection containing an   internal 8mm calcification, likely an appendicolith (9:81, 8:33). A 6.1 x   2.7 cm subcutaneous fluid collection with surrounding infiltration is   noted.  BOWEL: Within normal limits.  PERITONEUM: No ascites.  VESSELS: Within normal limits.  BONES: Within normal limits.    IMPRESSION:  Findings suggestive of acute ruptured appendicitis with extension into   the rectus abdominis muscle where there is a partially peripherally   walled off fluid collection containing an appendicolith.    Overlying subcutaneous fluid collection and surrounding inflammation may   represent seroma, however, superimposed infection/abscess are in the   differential.

## 2023-07-03 NOTE — ED PROVIDER NOTE - ATTENDING CONTRIBUTION TO CARE
33y F , s/p  on  by Dr. Delgadillo; presents for abdominal pain and fever. Pt reports onset 2 days ago of increased pain/swelling/redness over  site, associated with fever. On arrival, pt tachycardic and febrile. On exam, pt with area of erythema/tenderness/induration overlying  incision site. Sepsis workup initiated. Given antibiotics, fluids. Labs, cultures sent. CT pelvis obtained. GYN consulted. 33y F , s/p  on  by Dr. Delgadillo; presents for abdominal pain and fever. Pt reports onset 2 days ago of increased pain/swelling/redness over  site, associated with fever. On arrival, pt tachycardic and febrile. On exam, pt with area of erythema/tenderness/induration overlying  incision site, +LLQ tenderness to palpation. Sepsis workup initiated. Given antibiotics, fluids. Labs, cultures sent. CT pelvis obtained. GYN consulted.

## 2023-07-03 NOTE — ED PROVIDER NOTE - NS ED ROS FT
Review of Systems  SKIN: warm, dry w/ no rash or bleeding  RESPIRATORY: no cough or SOB  CARDIAC: no chest pain & no palpitations  GI: +ABDOMINAL PAIN. +FEVERS  MUSCULOSKELETAL:  no joint pain, swelling or redness  NEURO: Alert, oriented x3. No weakness, numbness.  ucinations or depression

## 2023-07-03 NOTE — ED PROVIDER NOTE - PHYSICAL EXAMINATION
Gen: Well appearing in NAD  Head: NC/AT  Neck: trachea midline  Resp:  No distress  Abd: soft, +SURGICAL INCISION. +ERYTHEM AND TENDERNESS. +BASEBALL SIZED MASS w/ INDURATION.   Ext: no deformities  Neuro:  A&O appears non focal  Skin:  Warm and dry as visualized  Psych:  Normal affect and mood

## 2023-07-03 NOTE — BRIEF OPERATIVE NOTE - NSICDXBRIEFPROCEDURE_GEN_ALL_CORE_FT
PROCEDURES:  Exploratory laparotomy 03-Jul-2023 14:07:12  Leilani Jennings  Open appendectomy 03-Jul-2023 14:07:18  Leilani Jennings  Small bowel resection 03-Jul-2023 14:07:24  Leilani Jennings

## 2023-07-03 NOTE — CONSULT NOTE ADULT - ASSESSMENT
A:  34 yo F s/p  on 2023 with complaints of peristent abd pain near the incision site, new palpable mass at left lateral aspect of incision with erythema concerning for abscess/infected in seroma/hematoma in setting of leukocytosis  and subjective fevers. CT with findings concerning for ruptured appendicitis, base identified and intact. CT otherwise sub-optimal in setting of post-surgical changes. No RLQ pain,  clinical findings of appendicitis. However, further assessment warranted.     Plan:   - Recommend IV abx   - Serial abd exams   - If operative management indicated from obgyn perspective can assess appendix intra-operatively   - surgery will follow   - Remainder of care per primary team

## 2023-07-03 NOTE — ED ADULT TRIAGE NOTE - CHIEF COMPLAINT QUOTE
pt c/o fever , 5/18/2023- c section & tubal, c/o hematoma at incision site  A&Ox3, resp wnl, saw MD 6/15

## 2023-07-03 NOTE — H&P ADULT - NSHPLABSRESULTS_GEN_ALL_CORE
11.1   17.17 )-----------( 412      ( 2023 01:00 )             34.0     07-    137  |  101  |  9.4  ----------------------------<  95  3.6   |  21.0<L>  |  0.73    Ca    8.8      2023 01:00    TPro  7.4  /  Alb  4.0  /  TBili  0.5  /  DBili  x   /  AST  12  /  ALT  8   /  AlkPhos  96  07-03    Urinalysis Basic - ( 2023 01:00 )    Color: x / Appearance: x / SG: x / pH: x  Gluc: 95 mg/dL / Ketone: x  / Bili: x / Urobili: x   Blood: x / Protein: x / Nitrite: x   Leuk Esterase: x / RBC: x / WBC x   Sq Epi: x / Non Sq Epi: x / Bacteria: x      HCG Quantitative, Serum: <4.0 mIU/mL (23 @ 01:00)    ABO RH Interpretation: O POS (23 @ 04:24)    < from: CT Pelvis w/ IV Cont (23 @ 02:50) >    ACC: [id number] EXAM:  CT PELVIS ONLY IC   ORDERED BY: ALISHA BLANCHARD     PROCEDURE DATE:  2023          INTERPRETATION:  CLINICAL INFORMATION: Abdominal pain over the site of   recent  incision.    COMPARISON: None.    CONTRAST/COMPLICATIONS:  IV Contrast: Omnipaque 350  90 cc administered   10 cc discarded  Oral Contrast: NONE  Complications: None reported at time of study completion    PROCEDURE:  CT of the Pelvis was performed.  Sagittal and coronal reformats were performed.    FINDINGS:  BLADDER: Within normal limits.  REPRODUCTIVE ORGANS: Uterus and adnexa within normal limits.  LYMPH NODES: No pelvic lymphadenopathy.    VISUALIZED PORTIONS:  ABDOMINAL WALL/ABDOMINAL ORGANS: A thick-walled dilated fluid-filled   appendix extends through to the rectus abdominis muscle where there is a   2.6 x 0.9 cm peripherally enhancing fluid collection containing an   internal 8mm calcification, likely an appendicolith (9:81, 8:33). A 6.1 x   2.7 cm subcutaneous fluid collection with surrounding infiltration is   noted.  BOWEL: Within normal limits.  PERITONEUM: No ascites.  VESSELS: Within normal limits.  BONES: Within normal limits.    IMPRESSION:  Findings suggestive of acute ruptured appendicitis with extension into   the rectus abdominis muscle where there is a partially peripherally   walled off fluid collection containing an appendicolith.    Overlying subcutaneous fluid collection and surrounding inflammation may   represent seroma, however, superimposed infection/abscess are in the   differential.        --- End of Report ---            RHONDA OSBORN MD; Attending Radiologist  This document has been electronically signed. Jul  3 2023  4:08AM    < end of copied text >

## 2023-07-03 NOTE — CHART NOTE - NSCHARTNOTEFT_GEN_A_CORE
Called to OR by general surgery. Abdominal abscess noted without involvement of pelvic organs. No GYN intraop intervention indicated. No further intervention from GYN standpoint indicated. Please call GYN as needed.     Discussed with Dr. Delgadillo.

## 2023-07-04 LAB
ALBUMIN SERPL ELPH-MCNC: 2.8 G/DL — LOW (ref 3.3–5.2)
ALP SERPL-CCNC: 69 U/L — SIGNIFICANT CHANGE UP (ref 40–120)
ALT FLD-CCNC: 6 U/L — SIGNIFICANT CHANGE UP
ANION GAP SERPL CALC-SCNC: 14 MMOL/L — SIGNIFICANT CHANGE UP (ref 5–17)
AST SERPL-CCNC: 14 U/L — SIGNIFICANT CHANGE UP
BASOPHILS # BLD AUTO: 0.03 K/UL — SIGNIFICANT CHANGE UP (ref 0–0.2)
BASOPHILS NFR BLD AUTO: 0.2 % — SIGNIFICANT CHANGE UP (ref 0–2)
BILIRUB SERPL-MCNC: 0.9 MG/DL — SIGNIFICANT CHANGE UP (ref 0.4–2)
BUN SERPL-MCNC: 5.6 MG/DL — LOW (ref 8–20)
CALCIUM SERPL-MCNC: 8 MG/DL — LOW (ref 8.4–10.5)
CHLORIDE SERPL-SCNC: 104 MMOL/L — SIGNIFICANT CHANGE UP (ref 96–108)
CO2 SERPL-SCNC: 20 MMOL/L — LOW (ref 22–29)
CREAT SERPL-MCNC: 0.51 MG/DL — SIGNIFICANT CHANGE UP (ref 0.5–1.3)
EGFR: 126 ML/MIN/1.73M2 — SIGNIFICANT CHANGE UP
EOSINOPHIL # BLD AUTO: 0.01 K/UL — SIGNIFICANT CHANGE UP (ref 0–0.5)
EOSINOPHIL NFR BLD AUTO: 0.1 % — SIGNIFICANT CHANGE UP (ref 0–6)
GLUCOSE SERPL-MCNC: 94 MG/DL — SIGNIFICANT CHANGE UP (ref 70–99)
HCT VFR BLD CALC: 28.8 % — LOW (ref 34.5–45)
HGB BLD-MCNC: 9.2 G/DL — LOW (ref 11.5–15.5)
IMM GRANULOCYTES NFR BLD AUTO: 0.4 % — SIGNIFICANT CHANGE UP (ref 0–0.9)
LYMPHOCYTES # BLD AUTO: 18.9 % — SIGNIFICANT CHANGE UP (ref 13–44)
LYMPHOCYTES # BLD AUTO: 2.37 K/UL — SIGNIFICANT CHANGE UP (ref 1–3.3)
MAGNESIUM SERPL-MCNC: 1.6 MG/DL — SIGNIFICANT CHANGE UP (ref 1.6–2.6)
MCHC RBC-ENTMCNC: 27.5 PG — SIGNIFICANT CHANGE UP (ref 27–34)
MCHC RBC-ENTMCNC: 31.9 GM/DL — LOW (ref 32–36)
MCV RBC AUTO: 86 FL — SIGNIFICANT CHANGE UP (ref 80–100)
MONOCYTES # BLD AUTO: 1.01 K/UL — HIGH (ref 0–0.9)
MONOCYTES NFR BLD AUTO: 8 % — SIGNIFICANT CHANGE UP (ref 2–14)
NEUTROPHILS # BLD AUTO: 9.1 K/UL — HIGH (ref 1.8–7.4)
NEUTROPHILS NFR BLD AUTO: 72.4 % — SIGNIFICANT CHANGE UP (ref 43–77)
PHOSPHATE SERPL-MCNC: 3.5 MG/DL — SIGNIFICANT CHANGE UP (ref 2.4–4.7)
PLATELET # BLD AUTO: 369 K/UL — SIGNIFICANT CHANGE UP (ref 150–400)
POTASSIUM SERPL-MCNC: 4 MMOL/L — SIGNIFICANT CHANGE UP (ref 3.5–5.3)
POTASSIUM SERPL-SCNC: 4 MMOL/L — SIGNIFICANT CHANGE UP (ref 3.5–5.3)
PROT SERPL-MCNC: 5.5 G/DL — LOW (ref 6.6–8.7)
RBC # BLD: 3.35 M/UL — LOW (ref 3.8–5.2)
RBC # FLD: 13.5 % — SIGNIFICANT CHANGE UP (ref 10.3–14.5)
SODIUM SERPL-SCNC: 138 MMOL/L — SIGNIFICANT CHANGE UP (ref 135–145)
WBC # BLD: 12.57 K/UL — HIGH (ref 3.8–10.5)
WBC # FLD AUTO: 12.57 K/UL — HIGH (ref 3.8–10.5)

## 2023-07-04 RX ORDER — MAGNESIUM OXIDE 400 MG ORAL TABLET 241.3 MG
400 TABLET ORAL
Refills: 0 | Status: COMPLETED | OUTPATIENT
Start: 2023-07-04 | End: 2023-07-05

## 2023-07-04 RX ORDER — SODIUM CHLORIDE 9 MG/ML
1000 INJECTION, SOLUTION INTRAVENOUS
Refills: 0 | Status: DISCONTINUED | OUTPATIENT
Start: 2023-07-04 | End: 2023-07-06

## 2023-07-04 RX ORDER — MAGNESIUM SULFATE 500 MG/ML
4 VIAL (ML) INJECTION ONCE
Refills: 0 | Status: COMPLETED | OUTPATIENT
Start: 2023-07-04 | End: 2023-07-04

## 2023-07-04 RX ADMIN — Medication 25 GRAM(S): at 17:34

## 2023-07-04 RX ADMIN — PIPERACILLIN AND TAZOBACTAM 25 GRAM(S): 4; .5 INJECTION, POWDER, LYOPHILIZED, FOR SOLUTION INTRAVENOUS at 05:07

## 2023-07-04 RX ADMIN — PIPERACILLIN AND TAZOBACTAM 25 GRAM(S): 4; .5 INJECTION, POWDER, LYOPHILIZED, FOR SOLUTION INTRAVENOUS at 11:11

## 2023-07-04 RX ADMIN — Medication 400 MILLIGRAM(S): at 17:33

## 2023-07-04 RX ADMIN — SODIUM CHLORIDE 125 MILLILITER(S): 9 INJECTION, SOLUTION INTRAVENOUS at 04:43

## 2023-07-04 RX ADMIN — Medication 975 MILLIGRAM(S): at 11:11

## 2023-07-04 RX ADMIN — PIPERACILLIN AND TAZOBACTAM 25 GRAM(S): 4; .5 INJECTION, POWDER, LYOPHILIZED, FOR SOLUTION INTRAVENOUS at 22:52

## 2023-07-04 RX ADMIN — Medication 975 MILLIGRAM(S): at 17:33

## 2023-07-04 RX ADMIN — Medication 975 MILLIGRAM(S): at 05:07

## 2023-07-04 RX ADMIN — Medication 400 MILLIGRAM(S): at 09:40

## 2023-07-04 RX ADMIN — Medication 975 MILLIGRAM(S): at 18:15

## 2023-07-04 RX ADMIN — Medication 975 MILLIGRAM(S): at 01:23

## 2023-07-04 RX ADMIN — ENOXAPARIN SODIUM 40 MILLIGRAM(S): 100 INJECTION SUBCUTANEOUS at 23:42

## 2023-07-04 RX ADMIN — ENOXAPARIN SODIUM 40 MILLIGRAM(S): 100 INJECTION SUBCUTANEOUS at 00:38

## 2023-07-04 RX ADMIN — Medication 975 MILLIGRAM(S): at 12:50

## 2023-07-04 RX ADMIN — Medication 400 MILLIGRAM(S): at 18:16

## 2023-07-04 RX ADMIN — Medication 975 MILLIGRAM(S): at 23:42

## 2023-07-04 RX ADMIN — MAGNESIUM OXIDE 400 MG ORAL TABLET 400 MILLIGRAM(S): 241.3 TABLET ORAL at 17:33

## 2023-07-04 RX ADMIN — Medication 400 MILLIGRAM(S): at 11:05

## 2023-07-04 RX ADMIN — Medication 975 MILLIGRAM(S): at 00:38

## 2023-07-04 NOTE — PROGRESS NOTE ADULT - SUBJECTIVE AND OBJECTIVE BOX
SUBJECTIVE/24 hour events:  32 yo F s/p  on 2023 with complaints of peristent abd pain near the incision site, mostly LLQ. She states that pain got worse on last week Thursday , with associated erythema and subjective fever/chills. She denies any N&V, changes in bowel movements, diarrhea/constipation, and/or bloody BM. CT demonstrates an overlying subcutaneous fluid collection with surrounding inflammation, concern for infectious process, abscess/seroma. Also there is a concern for acute ruptured appendicitis with extension into rectus abdominus muscle , ? walled off appendicolith, surgery consulted for evaluation. On review of scan, based of appendix is identified and intact. Difficult to access tip of appendix in setting of post-surgical changes. She denies any pain in RLQ.  Patient WBC 17. Afebrile and HDS. Patient taken to the OR for  Exploratory laparotomy, Open appendectomy ,   Small bowel resection. Patient with no acute events post-op, pain well controlled, post-surgical wound vac in place, tolerating clear liquid diet.       Vital Signs Last 24 Hrs  T(C): 37.3 (2023 20:27), Max: 37.4 (2023 03:53)  T(F): 99.1 (2023 20:27), Max: 99.3 (2023 03:53)  HR: 93 (2023 20:27) (77 - 100)  BP: 119/82 (2023 20:27) (118/71 - 132/52)  BP(mean): 96 (2023 19:30) (72 - 96)  RR: 18 (2023 20:27) (13 - 21)  SpO2: 97% (2023 20:27) (96% - 100%)    Parameters below as of 2023 20:27  Patient On (Oxygen Delivery Method): room air      Drug Dosing Weight  Height (cm): 152.4 (2023 20:27)  Weight (kg): 100 (2023 20:27)  BMI (kg/m2): 43.1 (2023 20:27)  BSA (m2): 1.95 (2023 20:27)  I&O's Detail    2023 07:01  -  2023 02:35  --------------------------------------------------------  IN:  Total IN: 0 mL    OUT:    Indwelling Catheter - Urethral (mL): 350 mL  Total OUT: 350 mL    Total NET: -350 mL        Allergies    No Known Allergies    Intolerances                              9.7    14.47 )-----------( 341      ( 2023 07:22 )             30.1   07-03    139  |  105  |  6.7<L>  ----------------------------<  100<H>  3.8   |  25.0  |  0.69    Ca    8.4      2023 07:22    TPro  6.0<L>  /  Alb  3.4  /  TBili  0.7  /  DBili  x   /  AST  10  /  ALT  7   /  AlkPhos  82  07-03  PT/INR - ( 2023 01:00 )   PT: 14.1 sec;   INR: 1.21 ratio         PTT - ( 2023 01:00 )  PTT:32.2 sec    ROS:      PHYSICAL EXAM:  Constitutional: patient resting comfortably in bed, in no acute distress  Respiratory: respirations are unlabored, no accessory muscle use, no conversational dyspnea  Cardiovascular: regular rate & rhythm  Gastrointestinal: Abdomen soft, mid-line surgical wound vac well seated , no guarding  Psychiatric: Normal mood, normal affect          MEDICATIONS  (STANDING):  acetaminophen     Tablet .. 975 milliGRAM(s) Oral every 6 hours  enoxaparin Injectable 40 milliGRAM(s) SubCutaneous every 24 hours  lactated ringers. 1000 milliLiter(s) (125 mL/Hr) IV Continuous <Continuous>  piperacillin/tazobactam IVPB.. 3.375 Gram(s) IV Intermittent every 8 hours    MEDICATIONS  (PRN):  ibuprofen  Tablet. 400 milliGRAM(s) Oral every 6 hours PRN Mild Pain (1 - 3)  simethicone 80 milliGRAM(s) Chew every 6 hours PRN Gas      RADIOLOGY STUDIES:    CULTURES:

## 2023-07-04 NOTE — PROGRESS NOTE ADULT - ATTENDING COMMENTS
32 yo F s/p  on 2023 with complaints of persistent abd pain near the incision site, new palpable mass at left lateral aspect of incision with erythema concerning for abscess/infected in seroma/hematoma in setting of leukocytosis  and subjective fevers. CT with findings concerning for ruptured appendicitis, base identified and intact. CT otherwise sub-optimal in setting of post-surgical changes. No RLQ pain,  clinical findings of appendicitis. However, further assessment warranted. now pod#1 ex-lap of sbr, appendectomy with wound vac placement  Awake alert  Bilateral BS  Hemodynamic intact  Abdomen soft, active BS  Neurologic grossly unchanged    Plan:   -  IV abx   - wound vac change q 4 days Thursday vac take down   - clear liquid diet, advance slowly   - pain control  - monitor for return of bowel function   - f/u labs  - dispo pending 34 yo F s/p  on 2023 with complaints of persistent abd pain near the incision site, new palpable mass at left lateral aspect of incision with erythema concerning for abscess/infected in seroma/hematoma in setting of leukocytosis  and subjective fevers. CT with findings concerning for ruptured appendicitis, base identified and intact. CT otherwise sub-optimal in setting of post-surgical changes. No RLQ pain,  clinical findings of appendicitis. However, further assessment warranted. now pod#1 ex-lap of sbr, appendectomy with wound vac placement  Awake alert  Bilateral BS  Hemodynamic intact  Abdomen soft, few BS. Vac in place  Neurologic grossly unchanged    Plan:   -  IV abx   - wound vac change q 4 days Thursday vac take down   - Keep NPO for there is a fresh anastomosis present  - pain control  - monitor for return of bowel function   - f/u labs  - dispo pending

## 2023-07-04 NOTE — PATIENT PROFILE ADULT - ARRIVAL FROM
[No Acute Distress] : no acute distress [Well Nourished] : well nourished [Well Developed] : well developed [EOMI] : extraocular movements intact [Normal Outer Ear/Nose] : the outer ears and nose were normal in appearance [Normal Oropharynx] : the oropharynx was normal [Normal TMs] : both tympanic membranes were normal [Normal Nasal Mucosa] : the nasal mucosa was normal [Supple] : supple [No Respiratory Distress] : no respiratory distress  [No Accessory Muscle Use] : no accessory muscle use [Clear to Auscultation] : lungs were clear to auscultation bilaterally [Normal Rate] : normal rate  [Regular Rhythm] : with a regular rhythm [No Joint Swelling] : no joint swelling [Grossly Normal Strength/Tone] : grossly normal strength/tone [Coordination Grossly Intact] : coordination grossly intact [No Focal Deficits] : no focal deficits [Normal Gait] : normal gait [Normal Insight/Judgement] : insight and judgment were intact [Normal Affect] : the affect was normal [de-identified] : peeling skin in anterior neck Home

## 2023-07-04 NOTE — PROGRESS NOTE ADULT - ASSESSMENT
34 yo F s/p  on 2023 with complaints of peristent abd pain near the incision site, new palpable mass at left lateral aspect of incision with erythema concerning for abscess/infected in seroma/hematoma in setting of leukocytosis  and subjective fevers. CT with findings concerning for ruptured appendicitis, base identified and intact. CT otherwise sub-optimal in setting of post-surgical changes. No RLQ pain,  clinical findings of appendicitis. However, further assessment warranted. now pod#1 ex-lap of sbr, appendectomy with wound vac placement    Plan:   -  IV abx   - wound vac change q 4 days thursday vac take down   - clear liquid diet, advance slowly   - pain control  - monitor for return of bowel function   - f/u labs  - dispo pending

## 2023-07-04 NOTE — PATIENT PROFILE ADULT - FUNCTIONAL ASSESSMENT - DAILY ACTIVITY 1.
3 = A little assistance You can access the FollowMyHealth Patient Portal offered by [facility] by registering at the following website: [email or web address]. By joining [facility]’s FollowMyHealth portal, you will also be able to view your health information using other applications (apps) compatible with our system.

## 2023-07-04 NOTE — PATIENT PROFILE ADULT - FALL HARM RISK - HARM RISK INTERVENTIONS

## 2023-07-05 LAB
-  AMIKACIN: SIGNIFICANT CHANGE UP
-  AMOXICILLIN/CLAVULANIC ACID: SIGNIFICANT CHANGE UP
-  AMPICILLIN/SULBACTAM: SIGNIFICANT CHANGE UP
-  AMPICILLIN: SIGNIFICANT CHANGE UP
-  AZTREONAM: SIGNIFICANT CHANGE UP
-  CEFAZOLIN: SIGNIFICANT CHANGE UP
-  CEFEPIME: SIGNIFICANT CHANGE UP
-  CEFOXITIN: SIGNIFICANT CHANGE UP
-  CEFTRIAXONE: SIGNIFICANT CHANGE UP
-  CIPROFLOXACIN: SIGNIFICANT CHANGE UP
-  ERTAPENEM: SIGNIFICANT CHANGE UP
-  GENTAMICIN: SIGNIFICANT CHANGE UP
-  IMIPENEM: SIGNIFICANT CHANGE UP
-  LEVOFLOXACIN: SIGNIFICANT CHANGE UP
-  MEROPENEM: SIGNIFICANT CHANGE UP
-  PIPERACILLIN/TAZOBACTAM: SIGNIFICANT CHANGE UP
-  TOBRAMYCIN: SIGNIFICANT CHANGE UP
-  TRIMETHOPRIM/SULFAMETHOXAZOLE: SIGNIFICANT CHANGE UP
ANION GAP SERPL CALC-SCNC: 15 MMOL/L — SIGNIFICANT CHANGE UP (ref 5–17)
BASOPHILS # BLD AUTO: 0.06 K/UL — SIGNIFICANT CHANGE UP (ref 0–0.2)
BASOPHILS NFR BLD AUTO: 0.6 % — SIGNIFICANT CHANGE UP (ref 0–2)
BUN SERPL-MCNC: 8.1 MG/DL — SIGNIFICANT CHANGE UP (ref 8–20)
CALCIUM SERPL-MCNC: 8.1 MG/DL — LOW (ref 8.4–10.5)
CHLORIDE SERPL-SCNC: 103 MMOL/L — SIGNIFICANT CHANGE UP (ref 96–108)
CO2 SERPL-SCNC: 20 MMOL/L — LOW (ref 22–29)
CREAT SERPL-MCNC: 0.72 MG/DL — SIGNIFICANT CHANGE UP (ref 0.5–1.3)
CULTURE RESULTS: SIGNIFICANT CHANGE UP
EGFR: 113 ML/MIN/1.73M2 — SIGNIFICANT CHANGE UP
EOSINOPHIL # BLD AUTO: 0.09 K/UL — SIGNIFICANT CHANGE UP (ref 0–0.5)
EOSINOPHIL NFR BLD AUTO: 0.8 % — SIGNIFICANT CHANGE UP (ref 0–6)
GLUCOSE SERPL-MCNC: 75 MG/DL — SIGNIFICANT CHANGE UP (ref 70–99)
HCT VFR BLD CALC: 31 % — LOW (ref 34.5–45)
HGB BLD-MCNC: 9.8 G/DL — LOW (ref 11.5–15.5)
IMM GRANULOCYTES NFR BLD AUTO: 0.3 % — SIGNIFICANT CHANGE UP (ref 0–0.9)
LYMPHOCYTES # BLD AUTO: 2.78 K/UL — SIGNIFICANT CHANGE UP (ref 1–3.3)
LYMPHOCYTES # BLD AUTO: 25.6 % — SIGNIFICANT CHANGE UP (ref 13–44)
MAGNESIUM SERPL-MCNC: 1.9 MG/DL — SIGNIFICANT CHANGE UP (ref 1.6–2.6)
MCHC RBC-ENTMCNC: 27.5 PG — SIGNIFICANT CHANGE UP (ref 27–34)
MCHC RBC-ENTMCNC: 31.6 GM/DL — LOW (ref 32–36)
MCV RBC AUTO: 87.1 FL — SIGNIFICANT CHANGE UP (ref 80–100)
METHOD TYPE: SIGNIFICANT CHANGE UP
MONOCYTES # BLD AUTO: 0.81 K/UL — SIGNIFICANT CHANGE UP (ref 0–0.9)
MONOCYTES NFR BLD AUTO: 7.4 % — SIGNIFICANT CHANGE UP (ref 2–14)
NEUTROPHILS # BLD AUTO: 7.11 K/UL — SIGNIFICANT CHANGE UP (ref 1.8–7.4)
NEUTROPHILS NFR BLD AUTO: 65.3 % — SIGNIFICANT CHANGE UP (ref 43–77)
ORGANISM # SPEC MICROSCOPIC CNT: SIGNIFICANT CHANGE UP
ORGANISM # SPEC MICROSCOPIC CNT: SIGNIFICANT CHANGE UP
PHOSPHATE SERPL-MCNC: 2.9 MG/DL — SIGNIFICANT CHANGE UP (ref 2.4–4.7)
PLATELET # BLD AUTO: 421 K/UL — HIGH (ref 150–400)
POTASSIUM SERPL-MCNC: 4.2 MMOL/L — SIGNIFICANT CHANGE UP (ref 3.5–5.3)
POTASSIUM SERPL-SCNC: 4.2 MMOL/L — SIGNIFICANT CHANGE UP (ref 3.5–5.3)
RBC # BLD: 3.56 M/UL — LOW (ref 3.8–5.2)
RBC # FLD: 13.7 % — SIGNIFICANT CHANGE UP (ref 10.3–14.5)
SODIUM SERPL-SCNC: 138 MMOL/L — SIGNIFICANT CHANGE UP (ref 135–145)
WBC # BLD: 10.88 K/UL — HIGH (ref 3.8–10.5)
WBC # FLD AUTO: 10.88 K/UL — HIGH (ref 3.8–10.5)

## 2023-07-05 RX ORDER — MAGNESIUM SULFATE 500 MG/ML
1 VIAL (ML) INJECTION ONCE
Refills: 0 | Status: COMPLETED | OUTPATIENT
Start: 2023-07-05 | End: 2023-07-05

## 2023-07-05 RX ADMIN — Medication 400 MILLIGRAM(S): at 17:38

## 2023-07-05 RX ADMIN — PIPERACILLIN AND TAZOBACTAM 25 GRAM(S): 4; .5 INJECTION, POWDER, LYOPHILIZED, FOR SOLUTION INTRAVENOUS at 06:22

## 2023-07-05 RX ADMIN — Medication 975 MILLIGRAM(S): at 17:37

## 2023-07-05 RX ADMIN — Medication 100 GRAM(S): at 09:43

## 2023-07-05 RX ADMIN — Medication 975 MILLIGRAM(S): at 13:25

## 2023-07-05 RX ADMIN — Medication 975 MILLIGRAM(S): at 06:19

## 2023-07-05 RX ADMIN — Medication 975 MILLIGRAM(S): at 00:12

## 2023-07-05 RX ADMIN — Medication 400 MILLIGRAM(S): at 07:15

## 2023-07-05 RX ADMIN — Medication 975 MILLIGRAM(S): at 06:49

## 2023-07-05 RX ADMIN — Medication 400 MILLIGRAM(S): at 06:45

## 2023-07-05 RX ADMIN — Medication 975 MILLIGRAM(S): at 12:20

## 2023-07-05 RX ADMIN — Medication 63.75 MILLIMOLE(S): at 09:43

## 2023-07-05 RX ADMIN — MAGNESIUM OXIDE 400 MG ORAL TABLET 400 MILLIGRAM(S): 241.3 TABLET ORAL at 08:16

## 2023-07-05 RX ADMIN — Medication 400 MILLIGRAM(S): at 18:06

## 2023-07-05 RX ADMIN — Medication 975 MILLIGRAM(S): at 18:06

## 2023-07-05 NOTE — PROGRESS NOTE ADULT - ASSESSMENT
34 yo F s/p  on 2023 with complaints of peristent abd pain near the incision site, new palpable mass at left lateral aspect of incision with erythema concerning for abscess/infected in seroma/hematoma in setting of leukocytosis  and subjective fevers. CT with findings concerning for ruptured appendicitis, base identified and intact. CT otherwise sub-optimal in setting of post-surgical changes. No RLQ pain,  clinical findings of appendicitis. However, further assessment warranted. now pod#2 ex-lap of sbr, appendectomy with wound vac placement. Patient voiding and having flatus     Plan:   -  IV abx   - wound vac change q 4 days thursday vac take down   - NPO with IVF for now,   - pain control  - monitor for return of bowel function   - f/u labs  - dispo pending     32 yo F s/p  on 2023 with complaints of persistent abd pain near the incision site, new palpable mass at left lateral aspect of incision with erythema concerning for abscess/infected in seroma/hematoma in setting of leukocytosis  and subjective fevers. CT with findings concerning for ruptured appendicitis, base identified and intact. CT otherwise sub-optimal in setting of post-surgical changes. No RLQ pain,  clinical findings of appendicitis. However, further assessment warranted. now pod#2 ex-lap of sbr, appendectomy with wound vac placement. Patient voiding and having flatus     Plan:   -  IV abx   - wound vac change q 4 days thursday vac take down   - NPO with IVF for now,   - pain control  - monitor for return of bowel function   - f/u labs  - dispo pending

## 2023-07-05 NOTE — PROGRESS NOTE ADULT - SUBJECTIVE AND OBJECTIVE BOX
SUBJECTIVE/24 hour events:  34 yo F s/p  on 2023 with complaints of peristent abd pain near the incision site, mostly LLQ. She states that pain got worse on last week Thursday , with associated erythema and subjective fever/chills. She denies any N&V, changes in bowel movements, diarrhea/constipation, and/or bloody BM. CT demonstrates an overlying subcutaneous fluid collection with surrounding inflammation, concern for infectious process, abscess/seroma. Also there is a concern for acute ruptured appendicitis with extension into rectus abdominus muscle , ? walled off appendicolith, surgery consulted for evaluation. On review of scan, based of appendix is identified and intact. Difficult to access tip of appendix in setting of post-surgical changes. She denies any pain in RLQ.  Patient WBC 17. Afebrile and HDS. Patient taken to the OR for  Exploratory laparotomy, Open appendectomy ,   Small bowel resection pod#2 . Patient with no acute events overnight, pain well controlled, post-surgical wound vac in place, started having flatus, examined and spoke to patient with  [id number] To note patient began to have vaginal bleeding, likely first menses after  in may, RN called GYN and they had suggested that as well    Vital Signs Last 24 Hrs  T(C): 36.9 (2023 01:20), Max: 36.9 (2023 21:59)  T(F): 98.4 (2023 01:20), Max: 98.5 (2023 21:59)  HR: 86 (2023 01:20) (70 - 89)  BP: 109/68 (2023 01:20) (101/68 - 110/75)  BP(mean): --  RR: 17 (2023 01:20) (16 - 18)  SpO2: 98% (2023 01:20) (92% - 98%)    Parameters below as of :20  Patient On (Oxygen Delivery Method): room air      Drug Dosing Weight  Height (cm): 152.4 (2023 20:27)  Weight (kg): 100 (2023 20:27)  BMI (kg/m2): 43.1 (2023 20:27)  BSA (m2): 1.95 (2023 20:27)  I&O's Detail    2023 07:01  -  2023 07:00  --------------------------------------------------------  IN:    Lactated Ringers: 1500 mL    Oral Fluid: 230 mL  Total IN: 1730 mL    OUT:    Indwelling Catheter - Urethral (mL): 2150 mL  Total OUT: 2150 mL    Total NET: -420 mL      2023 07:01  -  2023 02:29  --------------------------------------------------------  IN:  Total IN: 0 mL    OUT:    Voided (mL): 1175 mL  Total OUT: 1175 mL    Total NET: -1175 mL        Allergies    No Known Allergies    Intolerances                              9.2    12.57 )-----------( 369      ( 2023 06:20 )             28.8   07-04    138  |  104  |  5.6<L>  ----------------------------<  94  4.0   |  20.0<L>  |  0.51    Ca    8.0<L>      2023 06:20  Phos  3.5     07-04  Mg     1.6     07-04    TPro  5.5<L>  /  Alb  2.8<L>  /  TBili  0.9  /  DBili  x   /  AST  14  /  ALT  6   /  AlkPhos  69  07-04      ROS:      PHYSICAL EXAM:  Constitutional: patient resting comfortably in bed, in good spirits   Respiratory: respirations are unlabored, no accessory muscle use, no conversational dyspnea  Cardiovascular: regular rate & rhythm  Gastrointestinal: Abdomen soft, mid-line surgical wound vac well seated , no guarding  Psychiatric: Normal mood, normal affect        MEDICATIONS  (STANDING):  acetaminophen     Tablet .. 975 milliGRAM(s) Oral every 6 hours  enoxaparin Injectable 40 milliGRAM(s) SubCutaneous every 24 hours  lactated ringers. 1000 milliLiter(s) (80 mL/Hr) IV Continuous <Continuous>  magnesium oxide 400 milliGRAM(s) Oral two times a day with meals  piperacillin/tazobactam IVPB.. 3.375 Gram(s) IV Intermittent every 8 hours    MEDICATIONS  (PRN):  ibuprofen  Tablet. 400 milliGRAM(s) Oral every 6 hours PRN Mild Pain (1 - 3)  simethicone 80 milliGRAM(s) Chew every 6 hours PRN Gas      RADIOLOGY STUDIES:    CULTURES:

## 2023-07-05 NOTE — CHART NOTE - NSCHARTNOTEFT_GEN_A_CORE
My conversation with the patient continued.  I had an extended discussions with the patient and her sister this morning with the . The extend and nature of the surgery explained in detail. Their questions were answered. She is passing gas a feeling better.   I appreciate the great work by the surgical team  Onyebeke.

## 2023-07-06 ENCOUNTER — TRANSCRIPTION ENCOUNTER (OUTPATIENT)
Age: 33
End: 2023-07-06

## 2023-07-06 VITALS
TEMPERATURE: 98 F | RESPIRATION RATE: 17 BRPM | SYSTOLIC BLOOD PRESSURE: 115 MMHG | HEART RATE: 89 BPM | DIASTOLIC BLOOD PRESSURE: 79 MMHG | OXYGEN SATURATION: 97 %

## 2023-07-06 LAB
ANION GAP SERPL CALC-SCNC: 12 MMOL/L — SIGNIFICANT CHANGE UP (ref 5–17)
BASOPHILS # BLD AUTO: 0.05 K/UL — SIGNIFICANT CHANGE UP (ref 0–0.2)
BASOPHILS NFR BLD AUTO: 0.7 % — SIGNIFICANT CHANGE UP (ref 0–2)
BUN SERPL-MCNC: 4 MG/DL — LOW (ref 8–20)
CALCIUM SERPL-MCNC: 8.1 MG/DL — LOW (ref 8.4–10.5)
CHLORIDE SERPL-SCNC: 107 MMOL/L — SIGNIFICANT CHANGE UP (ref 96–108)
CO2 SERPL-SCNC: 22 MMOL/L — SIGNIFICANT CHANGE UP (ref 22–29)
CREAT SERPL-MCNC: 0.44 MG/DL — LOW (ref 0.5–1.3)
EGFR: 131 ML/MIN/1.73M2 — SIGNIFICANT CHANGE UP
EOSINOPHIL # BLD AUTO: 0.13 K/UL — SIGNIFICANT CHANGE UP (ref 0–0.5)
EOSINOPHIL NFR BLD AUTO: 1.9 % — SIGNIFICANT CHANGE UP (ref 0–6)
GLUCOSE SERPL-MCNC: 81 MG/DL — SIGNIFICANT CHANGE UP (ref 70–99)
HCT VFR BLD CALC: 27.4 % — LOW (ref 34.5–45)
HGB BLD-MCNC: 8.6 G/DL — LOW (ref 11.5–15.5)
IMM GRANULOCYTES NFR BLD AUTO: 0.3 % — SIGNIFICANT CHANGE UP (ref 0–0.9)
LYMPHOCYTES # BLD AUTO: 1.97 K/UL — SIGNIFICANT CHANGE UP (ref 1–3.3)
LYMPHOCYTES # BLD AUTO: 28.3 % — SIGNIFICANT CHANGE UP (ref 13–44)
MAGNESIUM SERPL-MCNC: 1.7 MG/DL — SIGNIFICANT CHANGE UP (ref 1.6–2.6)
MCHC RBC-ENTMCNC: 27 PG — SIGNIFICANT CHANGE UP (ref 27–34)
MCHC RBC-ENTMCNC: 31.4 GM/DL — LOW (ref 32–36)
MCV RBC AUTO: 86.2 FL — SIGNIFICANT CHANGE UP (ref 80–100)
MONOCYTES # BLD AUTO: 0.59 K/UL — SIGNIFICANT CHANGE UP (ref 0–0.9)
MONOCYTES NFR BLD AUTO: 8.5 % — SIGNIFICANT CHANGE UP (ref 2–14)
NEUTROPHILS # BLD AUTO: 4.2 K/UL — SIGNIFICANT CHANGE UP (ref 1.8–7.4)
NEUTROPHILS NFR BLD AUTO: 60.3 % — SIGNIFICANT CHANGE UP (ref 43–77)
PHOSPHATE SERPL-MCNC: 2.7 MG/DL — SIGNIFICANT CHANGE UP (ref 2.4–4.7)
PLATELET # BLD AUTO: 405 K/UL — HIGH (ref 150–400)
POTASSIUM SERPL-MCNC: 3.7 MMOL/L — SIGNIFICANT CHANGE UP (ref 3.5–5.3)
POTASSIUM SERPL-SCNC: 3.7 MMOL/L — SIGNIFICANT CHANGE UP (ref 3.5–5.3)
RBC # BLD: 3.18 M/UL — LOW (ref 3.8–5.2)
RBC # FLD: 13.9 % — SIGNIFICANT CHANGE UP (ref 10.3–14.5)
SODIUM SERPL-SCNC: 141 MMOL/L — SIGNIFICANT CHANGE UP (ref 135–145)
WBC # BLD: 6.96 K/UL — SIGNIFICANT CHANGE UP (ref 3.8–10.5)
WBC # FLD AUTO: 6.96 K/UL — SIGNIFICANT CHANGE UP (ref 3.8–10.5)

## 2023-07-06 PROCEDURE — 86901 BLOOD TYPING SEROLOGIC RH(D): CPT

## 2023-07-06 PROCEDURE — 96375 TX/PRO/DX INJ NEW DRUG ADDON: CPT

## 2023-07-06 PROCEDURE — 83735 ASSAY OF MAGNESIUM: CPT

## 2023-07-06 PROCEDURE — 80053 COMPREHEN METABOLIC PANEL: CPT

## 2023-07-06 PROCEDURE — 0225U NFCT DS DNA&RNA 21 SARSCOV2: CPT

## 2023-07-06 PROCEDURE — C9399: CPT

## 2023-07-06 PROCEDURE — 85730 THROMBOPLASTIN TIME PARTIAL: CPT

## 2023-07-06 PROCEDURE — 83605 ASSAY OF LACTIC ACID: CPT

## 2023-07-06 PROCEDURE — 87075 CULTR BACTERIA EXCEPT BLOOD: CPT

## 2023-07-06 PROCEDURE — 85610 PROTHROMBIN TIME: CPT

## 2023-07-06 PROCEDURE — 85025 COMPLETE CBC W/AUTO DIFF WBC: CPT

## 2023-07-06 PROCEDURE — 88307 TISSUE EXAM BY PATHOLOGIST: CPT

## 2023-07-06 PROCEDURE — 84100 ASSAY OF PHOSPHORUS: CPT

## 2023-07-06 PROCEDURE — 88304 TISSUE EXAM BY PATHOLOGIST: CPT

## 2023-07-06 PROCEDURE — T1013: CPT

## 2023-07-06 PROCEDURE — 87070 CULTURE OTHR SPECIMN AEROBIC: CPT

## 2023-07-06 PROCEDURE — 87040 BLOOD CULTURE FOR BACTERIA: CPT

## 2023-07-06 PROCEDURE — 96374 THER/PROPH/DIAG INJ IV PUSH: CPT

## 2023-07-06 PROCEDURE — 86850 RBC ANTIBODY SCREEN: CPT

## 2023-07-06 PROCEDURE — 71045 X-RAY EXAM CHEST 1 VIEW: CPT

## 2023-07-06 PROCEDURE — 86900 BLOOD TYPING SEROLOGIC ABO: CPT

## 2023-07-06 PROCEDURE — 87186 SC STD MICRODIL/AGAR DIL: CPT

## 2023-07-06 PROCEDURE — 99285 EMERGENCY DEPT VISIT HI MDM: CPT

## 2023-07-06 PROCEDURE — 72193 CT PELVIS W/DYE: CPT | Mod: MA

## 2023-07-06 PROCEDURE — 87077 CULTURE AEROBIC IDENTIFY: CPT

## 2023-07-06 PROCEDURE — 36415 COLL VENOUS BLD VENIPUNCTURE: CPT

## 2023-07-06 PROCEDURE — 80048 BASIC METABOLIC PNL TOTAL CA: CPT

## 2023-07-06 PROCEDURE — C1889: CPT

## 2023-07-06 PROCEDURE — 87205 SMEAR GRAM STAIN: CPT

## 2023-07-06 PROCEDURE — 84702 CHORIONIC GONADOTROPIN TEST: CPT

## 2023-07-06 RX ORDER — MAGNESIUM SULFATE 500 MG/ML
4 VIAL (ML) INJECTION ONCE
Refills: 0 | Status: COMPLETED | OUTPATIENT
Start: 2023-07-06 | End: 2023-07-06

## 2023-07-06 RX ORDER — SODIUM,POTASSIUM PHOSPHATES 278-250MG
1 POWDER IN PACKET (EA) ORAL ONCE
Refills: 0 | Status: COMPLETED | OUTPATIENT
Start: 2023-07-06 | End: 2023-07-06

## 2023-07-06 RX ADMIN — ENOXAPARIN SODIUM 40 MILLIGRAM(S): 100 INJECTION SUBCUTANEOUS at 04:58

## 2023-07-06 RX ADMIN — Medication 975 MILLIGRAM(S): at 12:29

## 2023-07-06 RX ADMIN — Medication 25 GRAM(S): at 12:29

## 2023-07-06 RX ADMIN — Medication 975 MILLIGRAM(S): at 04:58

## 2023-07-06 RX ADMIN — Medication 1 PACKET(S): at 12:29

## 2023-07-06 RX ADMIN — Medication 400 MILLIGRAM(S): at 04:57

## 2023-07-06 RX ADMIN — Medication 975 MILLIGRAM(S): at 12:38

## 2023-07-06 RX ADMIN — SODIUM CHLORIDE 80 MILLILITER(S): 9 INJECTION, SOLUTION INTRAVENOUS at 04:56

## 2023-07-06 NOTE — PROGRESS NOTE ADULT - SUBJECTIVE AND OBJECTIVE BOX
SUBJECTIVE/24 hour events:    32 yo F s/p  on 2023 with complaints of peristent abd pain near the incision site, mostly LLQ. She states that pain got worse on last week Thursday , with associated erythema and subjective fever/chills. She denies any N&V, changes in bowel movements, diarrhea/constipation, and/or bloody BM. CT demonstrates an overlying subcutaneous fluid collection with surrounding inflammation, concern for infectious process, abscess/seroma. Also there is a concern for acute ruptured appendicitis with extension into rectus abdominus muscle , ? walled off appendicolith, surgery consulted for evaluation. On review of scan, based of appendix is identified and intact. Difficult to access tip of appendix in setting of post-surgical changes. She denies any pain in RLQ.  Patient WBC 17. Afebrile and HDS. Patient taken to the OR for  Exploratory laparotomy, Open appendectomy ,   Small bowel resection pod#3 . Patient with no acute events overnight, pain well controlled, post-surgical wound vac in place, having flatus. Patient likely to have diet advanced this am.     Vital Signs Last 24 Hrs  T(C): 36.7 (2023 21:51), Max: 36.7 (2023 09:40)  T(F): 98.1 (2023 21:51), Max: 98.1 (2023 21:51)  HR: 75 (2023 21:51) (75 - 97)  BP: 101/67 (2023 21:51) (100/64 - 112/74)  BP(mean): --  RR: 18 (2023 21:51) (18 - 18)  SpO2: 97% (2023 21:51) (96% - 97%)    Parameters below as of 2023 21:51  Patient On (Oxygen Delivery Method): room air      Drug Dosing Weight  Height (cm): 152.4 (2023 20:27)  Weight (kg): 100 (2023 20:27)  BMI (kg/m2): 43.1 (2023 20:27)  BSA (m2): 1.95 (2023 20:27)  I&O's Detail    2023 07:01  -  2023 07:00  --------------------------------------------------------  IN:  Total IN: 0 mL    OUT:    Voided (mL): 1575 mL  Total OUT: 1575 mL    Total NET: -1575 mL        Allergies    No Known Allergies    Intolerances                              9.8    10.88 )-----------( 421       06:41 )             31.0   07-05    138  |  103  |  8.1  ----------------------------<  75  4.2   |  20.0<L>  |  0.72    Ca    8.1<L>      2023 06:41  Phos  2.9     07-05  Mg     1.9     07-05    TPro  5.5<L>  /  Alb  2.8<L>  /  TBili  0.9  /  DBili  x   /  AST  14  /  ALT  6   /  AlkPhos  69  07-04      ROS:    PHYSICAL EXAM:  Constitutional: patient resting comfortably in bed,   Respiratory: respirations are unlabored, no accessory muscle use, no conversational dyspnea  Cardiovascular: regular rate & rhythm  Gastrointestinal: Abdomen soft, mid-line surgical wound vac well seated , no guarding  Psychiatric: Normal mood, normal affect        MEDICATIONS  (STANDING):  acetaminophen     Tablet .. 975 milliGRAM(s) Oral every 6 hours  enoxaparin Injectable 40 milliGRAM(s) SubCutaneous every 24 hours  lactated ringers. 1000 milliLiter(s) (80 mL/Hr) IV Continuous <Continuous>    MEDICATIONS  (PRN):  ibuprofen  Tablet. 400 milliGRAM(s) Oral every 6 hours PRN Mild Pain (1 - 3)  simethicone 80 milliGRAM(s) Chew every 6 hours PRN Gas      RADIOLOGY STUDIES:    CULTURES:

## 2023-07-06 NOTE — DISCHARGE NOTE PROVIDER - HOSPITAL COURSE
Patient is a 32 y/o otherwise healthy female who recently gave birth via  on  presenting with increasing lower abdominal pain, swelling and erythema. Initial labs and imaging concerning for an abdominal wall abscess. The CT also showed evidence of acute, ruptured appendicitis with extension into the rectus muscle. She was taken to the OR on 7/3 for an open appendectomy and exploration. Intraoperatively a serosal injury was noted to a section of small bowel which was adherent to the abdominal wall along with her uterus. Her appendix was also noted to be perforated, consistent with her CT imaging. Her appendix was removed without complication. An 8 cm section of small bowel was resected and a new anastomoses was formed. She tolerated the surgery well. Given her abdominal wall infection the wound was left open to heal via secondary intention and a wound vac was placed. She did well on POD 1-3 with reassuring physical exams, vitals and labs. She was set up with home vac services prior to her discharge.

## 2023-07-06 NOTE — DISCHARGE NOTE NURSING/CASE MANAGEMENT/SOCIAL WORK - PATIENT PORTAL LINK FT
You can access the FollowMyHealth Patient Portal offered by [facility] by registering at the following website: [email or web address]. By joining [facility]’s FollowMyHealth portal, you will also be able to view your health information using other applications (apps) compatible with our system.

## 2023-07-06 NOTE — PROGRESS NOTE ADULT - ASSESSMENT
34 yo F s/p  on 2023 with complaints of persistent abd pain near the incision site, new palpable mass at left lateral aspect of incision with erythema concerning for abscess/infected in seroma/hematoma in setting of leukocytosis  and subjective fevers. CT with findings concerning for ruptured appendicitis, base identified and intact. CT otherwise sub-optimal in setting of post-surgical changes. No RLQ pain,  clinical findings of appendicitis. However, further assessment warranted. now pod#3 ex-lap of sbr, appendectomy with wound vac placement. Patient voiding and having flatus and advanced to clears and tolerating     Plan:   -  IV abx regimen complete  - wound vac change q 4 days thursday vac take down   - advance to fulls today?,   - pain control  - f/u labs  - dispo pending

## 2023-07-06 NOTE — DISCHARGE NOTE PROVIDER - NSFOLLOWUPCLINICS_GEN_ALL_ED_FT
[facility] Acute Care Surgery  Acute Care Surgery  [address] [place] [id number]  Phone: [phone]  Fax:

## 2023-07-06 NOTE — DISCHARGE NOTE NURSING/CASE MANAGEMENT/SOCIAL WORK - NSDCVIVACCINE_GEN_ALL_CORE_FT
MMR; 21-May-2023 15:31; Aminata Brock (RN); [employer] [id number] (Exp. Date: 07-Jan-2024); SubCutaneous; Arm Left; 0.5 milliLiter(s); VIS (VIS Published: 20-Apr-2012, VIS Presented: 21-May-2023);

## 2023-07-06 NOTE — PROGRESS NOTE ADULT - NS ATTEND AMEND GEN_ALL_CORE FT
Patient seen and examined by me.   Pain controlled. No complaints.   Reports passing flatus.   Abdominal exam benign. Vac in place with good seal.   Wound culture growing GPCs.     -DC abx  -Advance diet as tolerated   -Vac change tomorrow
Patient seen and examined by me.   No acute events.   Tolerating clears and having bowel function.   Vac changed. Wound granulating well with no evidence of infection.   Will begin DC planning with home vac.

## 2023-07-06 NOTE — PROGRESS NOTE ADULT - REASON FOR ADMISSION
Ventricular Rate : 98   Atrial Rate : 98   P-R Interval : 172   QRS Duration : 86   Q-T Interval : 414   QTC Calculation(Bezet) : 528   P Axis : 67   R Axis : 11   T Axis : -58   Diagnosis : Normal sinus rhythm~ST T wave abnormality, consider inferior ischemia~Prolonged QT~CLINICAL CORRELATION REQUIRED~****Abnormal ECG****~When compared with ECG of 04-MAR-2017 05:49,~Vent. rate has increased BY  35 BPM~ST now depressed in Inferior leads~ST now depressed in Anterior leads~T wave inversion now evident in Inferior leads~Confirmed by CLAIRE WEIR, DARON ([id number]) on 3/8/2017 4:06:29 PM     
abdominal wall abscess

## 2023-07-06 NOTE — DISCHARGE NOTE PROVIDER - NSDCMRMEDTOKEN_GEN_ALL_CORE_FT
ibuprofen 600 mg oral tablet: 1 tab(s) orally every 6 hours  Tylenol 325 mg oral capsule: 1 cap(s) orally every 6 hours

## 2023-07-06 NOTE — DISCHARGE NOTE PROVIDER - NSDCCPCAREPLAN_GEN_ALL_CORE_FT
PRINCIPAL DISCHARGE DIAGNOSIS  Diagnosis: Post-operative wound abscess  Assessment and Plan of Treatment: Follow Up: Please call to make an appointment with acute care surgery clinic in 10-14 days after discharge. Also, please call to make an appointment with your primary care physician as per your usual schedule.   Activity: May return to normal activities as tolerated, however refrain from heavy lifting >10-15 pounds. Minimize physical activity to prevent losing the seal on your wound vac.  Diet: May continue regular diet.  Medications: Please take all home medications as prescribed by your primary care doctor. You are encouraged to take over-the-counter tylenol and/or ibuprofen for pain relief.  Wound Care: You will be going home with a wound vac. A home nurse will come to your house to change it every 3 days. Please make every effort to keep area clean and dry to prevent losing seal. The wound vac is the black spunge and the suction is set to 125mmhg.   Patient is advised to RETURN TO THE EMERGENCY DEPARTMENT for any of the following - worsening pain, fever/chills, nausea/vomiting, alterned mental status, chest pain, shortness of breath, or any other new/worsening symptoms.

## 2023-07-07 LAB — SURGICAL PATHOLOGY STUDY: SIGNIFICANT CHANGE UP

## 2023-07-07 NOTE — CHART NOTE - NSCHARTNOTEFT_GEN_A_CORE
Please note, I was present and rounded on this patient throughout the hospitalization and upon further review of the EMR the following diagnosis exists:     1.	Sepsis present on admission due to acute, ruptured appendicitis Please note, I was present and rounded on this patient throughout the hospitalization and upon further review of the EMR the following diagnosis exists:     1.	Sepsis present on admission due to post-operative wound abscess s/p  on

## 2023-07-08 LAB
CULTURE RESULTS: SIGNIFICANT CHANGE UP
CULTURE RESULTS: SIGNIFICANT CHANGE UP
SPECIMEN SOURCE: SIGNIFICANT CHANGE UP
SPECIMEN SOURCE: SIGNIFICANT CHANGE UP

## 2023-07-16 NOTE — CDI QUERY NOTE - NSCDIOTHERTXTBX_GEN_ALL_CORE_HH
Sepsis was notedonly in ED and the rest of documentation mentioned meeting sepsis criteria which does not equate to sepsis diagnosis. After study, please clarfiy the status of sepsis.  A.	Sepsis present on admission due to acute, ruptured appendicitis  B.	Sepsis present on admission due to post-operative wound abscess s/p  on   C.	Sepsis present on admission likely combination of acute, ruptured appendicitis and post-operative wound abscess s/p  on   D.	Other, please specify      ED ADULT Flow Sheet [Charted Location: [facility] ED] [Authored: 2023 00:35]  Temperature  Temp (F): 101.4 Degrees F  Temp (C) Temp (C): 38.6 Degrees C  Temp site Temp Site: oral    Heart Rate  Heart Rate Heart Rate (beats/min): 121 /min        WBC Count:  WBC Count: 6.96 K/uL (07.06.23 @ 06:00)   WBC Count: 10.88 K/uL (07.05.23 @ 06:41)   WBC Count: 12.57 K/uL (07.04.23 @ 06:20)   WBC Count: 14.47 K/uL (07.03.23 @ 07:22)   WBC Count: 17.17 K/uL (07.03.23 @ 01:00)         Impression:  1.     Principal Discharge Dx Post-operative wound abscess.  ASSESSMENT:   HAYDEE DUNBAR is a 32yo F who presented with sepsis and likely abdominal wall abscess s/p . Febrile and tachycardic but mainating blood pressure    SEPSIS – was this patient treated for sepsis? Yes.     Presentation suggestive of: Bacterial Etiology.        Progress Note Adult-Trauma Surgery Nurse Practitioner/Attend [Charted Location: [facility] 2GUL 2317 01] [Authored: 2023 02:35]-  · Reason for Admission	abdominal wall abscess    · Subjective and Objective:   SUBJECTIVE/24 hour events:  34 yo F s/p  on 2023 with complaints of peristent abd pain near the incision site, mostly LLQ. She states that pain got worse on last week Thursday , with associated erythema and subjective fever/chills.         Progress Note Adult-GYN Resident [Charted Location: [facility] PACU 22RR 17] [Authored: 2023 07:24]    Assessment and Plan:   • Assessment	  A/P: 33y  POD#46 s/p urgent rCS admitted for abdominal wall abscess suspicious for ruptured appedicitis; patient met sepsis criteria on admission by fever and tachycardia.          H&P Adult [Charted Location: [facility] 1605 01] [Authored: 2023 05:31]  In the ED patient was noted to be meeting sepsis criteria (, Temp 101.4); she received IVF and was started on zosyn.        piperacillin/tazobactam IVPB.. 3.375 Gram(s) IV Intermittent every 8 hours given 7/3-
Please clarify if serosal injury mentioned in the operative report was inherent or complication of the procedure.    A. Serosal tear was inherent and expected during this procedure and was not a complication  B. Serosal tear was a complication of the procedure and resection was done   C. Other  D. Not clinically significant        PREOPERATIVE DIAGNOSIS:  Perforated appendicitis.    POSTOPERATIVE DIAGNOSIS:  Wound infection with presence of appendix and small bowel, and previous Pfannenstiel incision.    OPERATION:  1.	Exploratory laparotomy.  2.	Open appendectomy.  3.	Small bowel resection with primary anastomosis.  Abdominal washout.      The area that had been adherent to the abdominal wall did appear to have significant amount of serosal and submucosal injuries, so the decision was made to resect this portion.  This was about 8 cm in length.  This was done using two 45 blue loads of the KONSTANTIN stapler and the mesentery was taken with the LigaSure device.  It was then handed off the field as specimen.  A side to side small bowel anastomosis was then performed using 2 0 silk suture to approximate.  Two enterotomies were made on either side using electrocautery, and a 45 load of the Endo KONSTANTIN stapler was then placed to create a common channel.

## 2023-07-17 ENCOUNTER — TRANSCRIPTION ENCOUNTER (OUTPATIENT)
Age: 33
End: 2023-07-17

## 2023-07-17 NOTE — CHART NOTE - NSCHARTNOTEFT_GEN_A_CORE
Please note, I was present and rounded on this patient throughout the hospitalization and upon further review of the EMR the following diagnosis exists:     1. Serosal tear was inherent and expected during this procedure and was not a complication

## 2023-07-18 ENCOUNTER — APPOINTMENT (OUTPATIENT)
Dept: TRAUMA SURGERY | Facility: CLINIC | Age: 33
End: 2023-07-18
Payer: MEDICAID

## 2023-07-18 VITALS
BODY MASS INDEX: 34.85 KG/M2 | HEIGHT: 61.5 IN | DIASTOLIC BLOOD PRESSURE: 67 MMHG | OXYGEN SATURATION: 96 % | TEMPERATURE: 97.4 F | RESPIRATION RATE: 16 BRPM | HEART RATE: 95 BPM | SYSTOLIC BLOOD PRESSURE: 100 MMHG | WEIGHT: 187 LBS

## 2023-07-18 PROCEDURE — 99024 POSTOP FOLLOW-UP VISIT: CPT

## 2023-07-19 NOTE — PHYSICAL EXAM
[Normal Breath Sounds] : Normal breath sounds [Normal Heart Sounds] : normal heart sounds [Abdomen Tenderness] : ~T ~M Abdominal tenderness [No Rash or Lesion] : No rash or lesion [Purpura] : no purpura  [Petechiae] : no petechiae [Skin Ulcer] : no ulcer [Skin Induration] : no induration [Alert] : alert [Oriented to Person] : oriented to person [Oriented to Place] : oriented to place [Oriented to Time] : oriented to time [Calm] : calm [de-identified] : wdwn  female  in  nad [de-identified] : non icteric sclera PERRLA EOMS intact  and  nl [de-identified] : trachea midline [de-identified] : soft    no distension    lower abdominal incision site with  wound vac in place     wound vac and sponge removed    incision site  approximating  well  with no active  drainage  no purulent discharge  and no active  bleeding   no  odor  no  signs  of  cellulitis   Wound vac to be discontinued( as per Dr Paulino)  wet to dry dressings   placed on  wound    superficial  in nature   tender to touch [de-identified] : see abdomen exam

## 2023-07-19 NOTE — REVIEW OF SYSTEMS
[Abdominal Pain] : abdominal pain [Skin Wound] : skin wound [Negative] : Psychiatric [FreeTextEntry7] : wound  vac  to /ex lap/open appendectomy [de-identified] : surgical incision site

## 2023-07-19 NOTE — VITALS
[Tender] : tender [Occasional] : occasional [FreeTextEntry3] : lower  abdominal wall /surgical incision site [FreeTextEntry1] : rest [FreeTextEntry2] : movement/cough   sneeze

## 2023-07-19 NOTE — HISTORY OF PRESENT ILLNESS
[FreeTextEntry1] : \par Hospital Course: \par Discharge Date	2023 \par Admission Date	2023 03:51 \par Reason for Admission	abdominal wall abscess \par Hospital Course	 \par Patient is a 34 y/o otherwise healthy female who recently gave birth via \par  on  presenting with increasing lower abdominal pain, swelling and \par erythema. Initial labs and imaging concerning for an abdominal wall abscess. \par The CT also showed evidence of acute, ruptured appendicitis with extension into \par the rectus muscle. She was taken to the OR on 7/3 for an open appendectomy and \par exploration. Intraoperatively a serosal injury was noted to a section of small \par bowel which was adherent to the abdominal wall along with her uterus. Her \par appendix was also noted to be perforated, consistent with her CT imaging. Her \par appendix was removed without complication. An 8 cm section of small bowel was \par resected and a new anastomoses was formed. She tolerated the surgery well. \par Given her abdominal wall infection the wound was left open to heal via \par secondary intention and a wound vac was placed. She did well on POD 1-3 with \par reassuring physical exams, vitals and labs. She was set up with home vac \par services prior to her discharge. \par \par Patient presents  to [facility]  clinic  for  post op exam   and  wound  check  Patient  presently  with  a  wound  vac to incision site   Patient  denies  any  fevers  no chills no n/v/d   Still with  abdominal pain  due  to incision Patient  has  VNS to  home

## 2023-07-19 NOTE — ASSESSMENT
[FreeTextEntry1] : RTC next  week  for  wound  check\par Continue wet to dry  dressing  to incision site\par F/u GYN\par Continue present  medical management\par Discussion with patient /spouse  All questions answered  Any acute symptoms and or concerns patient/spouse understands  to call back office  and  or  go  directly to [facility] ED\par

## 2023-07-22 ENCOUNTER — INPATIENT (INPATIENT)
Facility: HOSPITAL | Age: 33
LOS: 3 days | Discharge: HOME | DRG: 394 | End: 2023-07-26
Attending: STUDENT IN AN ORGANIZED HEALTH CARE EDUCATION/TRAINING PROGRAM | Admitting: STUDENT IN AN ORGANIZED HEALTH CARE EDUCATION/TRAINING PROGRAM
Payer: MEDICAID

## 2023-07-22 VITALS
HEIGHT: 60 IN | DIASTOLIC BLOOD PRESSURE: 76 MMHG | RESPIRATION RATE: 18 BRPM | TEMPERATURE: 98 F | HEART RATE: 72 BPM | WEIGHT: 190.48 LBS | OXYGEN SATURATION: 99 % | SYSTOLIC BLOOD PRESSURE: 117 MMHG

## 2023-07-22 DIAGNOSIS — Z98.891 HISTORY OF UTERINE SCAR FROM PREVIOUS SURGERY: Chronic | ICD-10-CM

## 2023-07-22 PROCEDURE — 99285 EMERGENCY DEPT VISIT HI MDM: CPT

## 2023-07-22 RX ORDER — ONDANSETRON 8 MG/1
4 TABLET, FILM COATED ORAL ONCE
Refills: 0 | Status: COMPLETED | OUTPATIENT
Start: 2023-07-22 | End: 2023-07-22

## 2023-07-22 RX ORDER — SODIUM CHLORIDE 9 MG/ML
1000 INJECTION INTRAMUSCULAR; INTRAVENOUS; SUBCUTANEOUS ONCE
Refills: 0 | Status: COMPLETED | OUTPATIENT
Start: 2023-07-22 | End: 2023-07-22

## 2023-07-22 RX ORDER — FAMOTIDINE 10 MG/ML
20 INJECTION INTRAVENOUS ONCE
Refills: 0 | Status: COMPLETED | OUTPATIENT
Start: 2023-07-22 | End: 2023-07-22

## 2023-07-22 RX ADMIN — ONDANSETRON 4 MILLIGRAM(S): 8 TABLET, FILM COATED ORAL at 23:38

## 2023-07-22 RX ADMIN — SODIUM CHLORIDE 1000 MILLILITER(S): 9 INJECTION INTRAMUSCULAR; INTRAVENOUS; SUBCUTANEOUS at 23:38

## 2023-07-22 RX ADMIN — FAMOTIDINE 20 MILLIGRAM(S): 10 INJECTION INTRAVENOUS at 23:38

## 2023-07-22 NOTE — ED ADULT TRIAGE NOTE - CHIEF COMPLAINT QUOTE
c/o of abd pain x few days.  c/o of nausea Hx of  2 months ago, s/p appendectomy, small intestine infection and had a vac c/o of abd pain x few days.  c/o of nausea, vomiting Hx of  2 months ago, s/p appendectomy, small intestine infection and had a vac

## 2023-07-22 NOTE — ED ADULT NURSE NOTE - OBJECTIVE STATEMENT
pt A&Ox4, c/o x3 days of worsening upper abd pain/burning that worsened with eating and vomiting, pt states that she had a  2 months and has been having complications with wound infection

## 2023-07-22 NOTE — ED PROVIDER NOTE - PHYSICAL EXAMINATION
Gen: NAD, AOx3  Head: NCAT  HEENT: EOMI, oral mucosa moist, normal conjunctiva, neck supple  Lung: CTAB, no respiratory distress  CV: rrr, no murmur, Normal perfusion  Abd: soft, +epigastric ttp, ND  MSK: No edema, no visible deformities  Neuro: No focal neurologic deficits  Skin: No rash   Psych: normal affect

## 2023-07-22 NOTE — ED PROVIDER NOTE - PROGRESS NOTE DETAILS
MARGIE BRUNER: PT evaluated by intake physician. HPI/PE/ROS as noted above. Will follow up plan per intake physician   pt still with epigastric abd discomfort , CT ordered at this time   new medication added without relief   mildly tender epigastric, surgical scar without discharge or erythema and no suprapubic tenderness.   advised that blood work wnl and that will need outpt fu with GI high grade sbo   surgical consult placed   lactate drawn

## 2023-07-22 NOTE — ED PROVIDER NOTE - COVID-19 ORDERING FACILITY
Patient Information     Patient Name  Myles Espinoza MRN  [id number] Sex  Male   1999      Letter by Vivek López MD at      Author:  Vivek López MD Service:  (none) Author Type:  (none)    Filed:   Encounter Date:  2017 Status:  (Other)           Myles Espinoza  [address]  [facility] [address]          2017    Dear Mr. Espinoza:      No inherited blood clot condition was discovered.    Results for orders placed or performed in visit on 17       PROTIME-INR       Result   Value Ref Range    INR  1.0 <1.3    PROTIME  12.0 11.9 - 15.2 sec   FACTOR II GENE MUTATION (EVALUATE THROMBOPHILIA)       Result   Value Ref Range    FACTOR II GENE MUTATION  Mutation not detected Mutation not detected    INTERPRETATION        Patient does not carry the [id number] mutation on either copy of the Factor II (F2) gene.     LUPUS ANTICOAGULANT       Result   Value Ref Range    PTT-LA RATIO               0.89 <1.15                    DRVVT RATIO                0.97 <1.20                   FACTOR V LEIDEN MUTATION (EVALUATE THROMBOPHILIA)       Result   Value Ref Range    FACTOR V LEIDEN  Mutation not detected Mutation not detected    INTERPRETATION        Patient does not carry the Leiden ([id number]) mutation on either copy of the Factor V (F5) gene.     CBC WITH AUTO DIFFERENTIAL       Result   Value Ref Range    WHITE BLOOD COUNT          12.1 4.5 - 13.0 thou/cu mm    RED BLOOD COUNT            5.84 (H) 4.50 - 5.30 mil/cu mm    HEMOGLOBIN                 16.3 (H) 13.0 - 16.0 g/dL    HEMATOCRIT                 47.6 36.0 - 51.0 %    MCV                        82 78 - 98 fL    MCH                        27.9 25.0 - 35.0 pg    MCHC                       34.2 32.0 - 36.0 g/dL    RDW                        12.5 11.5 - 15.5 %    PLATELET COUNT             354 140 - 440 thou/cu mm    MPV                        10.4 6.5 - 11.0 fL    NEUTROPHILS                59.9 33.0 - 64.0 %    LYMPHOCYTES                 31.3 25.0 - 48.0 %    MONOCYTES                  6.7 <12.0 %    EOSINOPHILS                1.5 <8.0 %    BASOPHILS                  0.2 <3.0 %    IMMATURE GRANULOCYTES(METAS,MYELOS,PROS)  0.4 %    ABSOLUTE NEUTROPHILS       7.3 1.5 - 8.0 thou/cu mm    ABSOLUTE LYMPHOCYTES       3.8 1.2 - 6.5 thou/cu mm    ABSOLUTE MONOCYTES         0.8 <0.9 thou/cu mm    ABSOLUTE EOSINOPHILS       0.2 <0.5 thou/cu mm    ABSOLUTE BASOPHILS         0.0 <0.3 thou/cu mm    ABSOLUTE IMMATURE GRANULOCYTES(METAS,MYELOS,PROS)  0.1 <=0.3 thou/cu mm     If you have any questions or concerns, please call the clinic at [phone].    Signed, Vivek López MD  Internal Medicine & Pediatrics        Greetings, Dr. López here.  I'd like to ask you to reconsider if [employer] would be right for you.  If you're not comfortable using a computer or smart phone, disregard this message and you won't hurt my feelings.    [employer] is an easier and more secure way for us to communicate with each other.  With [employer], you can quickly and easily view your health information and appointments at your clinic at any time and anywhere you have Internet access.  We even have secure access via your iPhone, iPad or other smart phone.  To learn more about [employer], please go to [email or web address]    To get started, you will need:     the [employer] web site ([email or web address])    your [employer] activation code:  [email or web address]    Expires: 10/16/2017  3:11 PM    the last 4 digits of your Social Security number (SSN)    your date of birth.      Remember to activate your account quickly -   Your activation code expires in 45 days!    In the event you have technical difficulties, please call   [employer] at [phone].           [facility]

## 2023-07-22 NOTE — ED ADULT NURSE NOTE - CHIEF COMPLAINT QUOTE
c/o of abd pain x few days.  c/o of nausea, vomiting Hx of  2 months ago, s/p appendectomy, small intestine infection and had a vac

## 2023-07-22 NOTE — ED PROVIDER NOTE - NS ED ATTENDING STATEMENT MOD
This was a shared visit with the LAURENT. I reviewed and verified the documentation and independently performed the documented:
right

## 2023-07-22 NOTE — ED PROVIDER NOTE - IV ALTEPLASE ADMIN OUTSIDE HIDDEN
Jamilah - he has not had a hearing test in a very long time.  Would it be OK for him to have his hearing checked here or should I send to [facility].   show

## 2023-07-22 NOTE — ED PROVIDER NOTE - OBJECTIVE STATEMENT
34yo F w h/o 3 c sections most recently 2 months ago complicated by wound infection, was doing well since, last 3 days upper abd pain, burning. worse with eating. today pain severe and associated with vomiting. NBNB. +nausea. normal BM. no fever/chills. no sick contacts. no travel. no CP/SOB. no URI sx. no bleeding. patient is lactating

## 2023-07-23 DIAGNOSIS — Z90.49 ACQUIRED ABSENCE OF OTHER SPECIFIED PARTS OF DIGESTIVE TRACT: Chronic | ICD-10-CM

## 2023-07-23 DIAGNOSIS — K56.609 UNSPECIFIED INTESTINAL OBSTRUCTION, UNSPECIFIED AS TO PARTIAL VERSUS COMPLETE OBSTRUCTION: ICD-10-CM

## 2023-07-23 LAB
ALBUMIN SERPL ELPH-MCNC: 4.2 G/DL — SIGNIFICANT CHANGE UP (ref 3.3–5.2)
ALP SERPL-CCNC: 93 U/L — SIGNIFICANT CHANGE UP (ref 40–120)
ALT FLD-CCNC: 16 U/L — SIGNIFICANT CHANGE UP
ANION GAP SERPL CALC-SCNC: 12 MMOL/L — SIGNIFICANT CHANGE UP (ref 5–17)
AST SERPL-CCNC: 15 U/L — SIGNIFICANT CHANGE UP
BASOPHILS # BLD AUTO: 0.05 K/UL — SIGNIFICANT CHANGE UP (ref 0–0.2)
BASOPHILS NFR BLD AUTO: 0.5 % — SIGNIFICANT CHANGE UP (ref 0–2)
BILIRUB SERPL-MCNC: 0.4 MG/DL — SIGNIFICANT CHANGE UP (ref 0.4–2)
BUN SERPL-MCNC: 12.6 MG/DL — SIGNIFICANT CHANGE UP (ref 8–20)
CALCIUM SERPL-MCNC: 9.1 MG/DL — SIGNIFICANT CHANGE UP (ref 8.4–10.5)
CHLORIDE SERPL-SCNC: 100 MMOL/L — SIGNIFICANT CHANGE UP (ref 96–108)
CO2 SERPL-SCNC: 25 MMOL/L — SIGNIFICANT CHANGE UP (ref 22–29)
CREAT SERPL-MCNC: 0.76 MG/DL — SIGNIFICANT CHANGE UP (ref 0.5–1.3)
EGFR: 106 ML/MIN/1.73M2 — SIGNIFICANT CHANGE UP
EOSINOPHIL # BLD AUTO: 0.13 K/UL — SIGNIFICANT CHANGE UP (ref 0–0.5)
EOSINOPHIL NFR BLD AUTO: 1.3 % — SIGNIFICANT CHANGE UP (ref 0–6)
GLUCOSE SERPL-MCNC: 90 MG/DL — SIGNIFICANT CHANGE UP (ref 70–99)
HCG SERPL-ACNC: <4 MIU/ML — SIGNIFICANT CHANGE UP
HCT VFR BLD CALC: 35.3 % — SIGNIFICANT CHANGE UP (ref 34.5–45)
HGB BLD-MCNC: 11.2 G/DL — LOW (ref 11.5–15.5)
IMM GRANULOCYTES NFR BLD AUTO: 0.3 % — SIGNIFICANT CHANGE UP (ref 0–0.9)
LACTATE BLDV-MCNC: 0.8 MMOL/L — SIGNIFICANT CHANGE UP (ref 0.5–2)
LIDOCAIN IGE QN: 23 U/L — SIGNIFICANT CHANGE UP (ref 22–51)
LYMPHOCYTES # BLD AUTO: 2.61 K/UL — SIGNIFICANT CHANGE UP (ref 1–3.3)
LYMPHOCYTES # BLD AUTO: 25.5 % — SIGNIFICANT CHANGE UP (ref 13–44)
MAGNESIUM SERPL-MCNC: 2 MG/DL — SIGNIFICANT CHANGE UP (ref 1.6–2.6)
MCHC RBC-ENTMCNC: 27.1 PG — SIGNIFICANT CHANGE UP (ref 27–34)
MCHC RBC-ENTMCNC: 31.7 GM/DL — LOW (ref 32–36)
MCV RBC AUTO: 85.3 FL — SIGNIFICANT CHANGE UP (ref 80–100)
MONOCYTES # BLD AUTO: 0.78 K/UL — SIGNIFICANT CHANGE UP (ref 0–0.9)
MONOCYTES NFR BLD AUTO: 7.6 % — SIGNIFICANT CHANGE UP (ref 2–14)
NEUTROPHILS # BLD AUTO: 6.65 K/UL — SIGNIFICANT CHANGE UP (ref 1.8–7.4)
NEUTROPHILS NFR BLD AUTO: 64.8 % — SIGNIFICANT CHANGE UP (ref 43–77)
PLATELET # BLD AUTO: 775 K/UL — HIGH (ref 150–400)
POTASSIUM SERPL-MCNC: 4.3 MMOL/L — SIGNIFICANT CHANGE UP (ref 3.5–5.3)
POTASSIUM SERPL-SCNC: 4.3 MMOL/L — SIGNIFICANT CHANGE UP (ref 3.5–5.3)
PROT SERPL-MCNC: 7.5 G/DL — SIGNIFICANT CHANGE UP (ref 6.6–8.7)
RBC # BLD: 4.14 M/UL — SIGNIFICANT CHANGE UP (ref 3.8–5.2)
RBC # FLD: 15.3 % — HIGH (ref 10.3–14.5)
SODIUM SERPL-SCNC: 137 MMOL/L — SIGNIFICANT CHANGE UP (ref 135–145)
WBC # BLD: 10.25 K/UL — SIGNIFICANT CHANGE UP (ref 3.8–10.5)
WBC # FLD AUTO: 10.25 K/UL — SIGNIFICANT CHANGE UP (ref 3.8–10.5)

## 2023-07-23 PROCEDURE — 74177 CT ABD & PELVIS W/CONTRAST: CPT | Mod: 26,MD

## 2023-07-23 PROCEDURE — 99222 1ST HOSP IP/OBS MODERATE 55: CPT | Mod: 24,GC

## 2023-07-23 PROCEDURE — 93010 ELECTROCARDIOGRAM REPORT: CPT

## 2023-07-23 RX ORDER — METOCLOPRAMIDE HCL 10 MG
10 TABLET ORAL ONCE
Refills: 0 | Status: COMPLETED | OUTPATIENT
Start: 2023-07-23 | End: 2023-07-23

## 2023-07-23 RX ORDER — ONDANSETRON 8 MG/1
4 TABLET, FILM COATED ORAL EVERY 6 HOURS
Refills: 0 | Status: DISCONTINUED | OUTPATIENT
Start: 2023-07-23 | End: 2023-07-26

## 2023-07-23 RX ORDER — KETOROLAC TROMETHAMINE 30 MG/ML
15 SYRINGE (ML) INJECTION ONCE
Refills: 0 | Status: DISCONTINUED | OUTPATIENT
Start: 2023-07-23 | End: 2023-07-23

## 2023-07-23 RX ORDER — ENOXAPARIN SODIUM 100 MG/ML
40 INJECTION SUBCUTANEOUS EVERY 24 HOURS
Refills: 0 | Status: DISCONTINUED | OUTPATIENT
Start: 2023-07-23 | End: 2023-07-26

## 2023-07-23 RX ORDER — LIDOCAINE 4 G/100G
10 CREAM TOPICAL ONCE
Refills: 0 | Status: COMPLETED | OUTPATIENT
Start: 2023-07-23 | End: 2023-07-23

## 2023-07-23 RX ORDER — SODIUM CHLORIDE 9 MG/ML
1000 INJECTION INTRAMUSCULAR; INTRAVENOUS; SUBCUTANEOUS
Refills: 0 | Status: DISCONTINUED | OUTPATIENT
Start: 2023-07-23 | End: 2023-07-25

## 2023-07-23 RX ORDER — KETOROLAC TROMETHAMINE 30 MG/ML
15 SYRINGE (ML) INJECTION EVERY 6 HOURS
Refills: 0 | Status: DISCONTINUED | OUTPATIENT
Start: 2023-07-23 | End: 2023-07-26

## 2023-07-23 RX ORDER — SUCRALFATE 1 G
1 TABLET ORAL ONCE
Refills: 0 | Status: COMPLETED | OUTPATIENT
Start: 2023-07-23 | End: 2023-07-23

## 2023-07-23 RX ORDER — ACETAMINOPHEN 500 MG
650 TABLET ORAL ONCE
Refills: 0 | Status: COMPLETED | OUTPATIENT
Start: 2023-07-23 | End: 2023-07-23

## 2023-07-23 RX ORDER — ACETAMINOPHEN 500 MG
1000 TABLET ORAL EVERY 6 HOURS
Refills: 0 | Status: DISCONTINUED | OUTPATIENT
Start: 2023-07-23 | End: 2023-07-26

## 2023-07-23 RX ADMIN — SODIUM CHLORIDE 1000 MILLILITER(S): 9 INJECTION INTRAMUSCULAR; INTRAVENOUS; SUBCUTANEOUS at 00:40

## 2023-07-23 RX ADMIN — SODIUM CHLORIDE 125 MILLILITER(S): 9 INJECTION INTRAMUSCULAR; INTRAVENOUS; SUBCUTANEOUS at 10:37

## 2023-07-23 RX ADMIN — Medication 10 MILLIGRAM(S): at 00:40

## 2023-07-23 RX ADMIN — LIDOCAINE 10 MILLILITER(S): 4 CREAM TOPICAL at 02:45

## 2023-07-23 RX ADMIN — Medication 15 MILLIGRAM(S): at 01:10

## 2023-07-23 RX ADMIN — Medication 650 MILLIGRAM(S): at 02:45

## 2023-07-23 RX ADMIN — Medication 1 GRAM(S): at 02:45

## 2023-07-23 RX ADMIN — Medication 15 MILLIGRAM(S): at 00:40

## 2023-07-23 NOTE — ED ADULT NURSE REASSESSMENT NOTE - NS ED NURSE REASSESS COMMENT FT1
report received from [facility].  Pt resting comfortably on stretcher.  No complaints of pain.  Respirations even and unlabored.  Awaiting surgery consult.  PIV wnl; flushing without difficulty.  In NAD, will continue to monitor.

## 2023-07-23 NOTE — H&P ADULT - HISTORY OF PRESENT ILLNESS
33F with PMH/PSH  who presented on 7/3/23 with wound infection and sepsis, found to have perforated appendicitis, now presenting with abdominal pain, nausea, and vomiting. Patient reports 4 days of intermittent abdominal pain in the upper midline which has worsened and become persistent over the past day. She reports that yesterday afternoon she started experiencing nausea and has had several episodes of emesis, last around 0200 this morning. She continues to pass flatus, last bowel movement late yesterday though notes it was very small. Last PO intake yesterday afternoon several hours prior to onset of N/V. Denies subjective fevers and chills, CP, SOB. 33F with PMH/PSH  who presented on 7/3/23 with wound infection and sepsis, found to have appendix and small bowel caught in closure, now presenting with abdominal pain, nausea, and vomiting. Patient reports 4 days of intermittent abdominal pain in the upper midline which has worsened and become persistent over the past day. She reports that yesterday afternoon she started experiencing nausea and has had several episodes of emesis, last around 0200 this morning. She continues to pass flatus, last bowel movement late yesterday though notes it was very small. Last PO intake yesterday afternoon several hours prior to onset of N/V. Denies subjective fevers and chills, CP, SOB.

## 2023-07-23 NOTE — H&P ADULT - ATTENDING COMMENTS
Patient seen and examined by me.   Reports mild abdominal pain and two episodes of vomiting.   Still passing gas.    Vitals remains stable.   On exam patient with mild abdominal tenderness and distension.   Labs reviewed. No leukocytosis or lactic acidosis.   CT corning for SBO with transition at anastomosis. No evidence of bowel ischemia.     A/P: Partial SBO. Likely due to edema at anastomosis. Good candidate for trial of conservative management.    -Admit to [facility]   -NPO/IVF  -Will consider NGT if clinical exam worsens. .   -May require re-operation if no improvement with conservative management

## 2023-07-23 NOTE — H&P ADULT - NSICDXPASTSURGICALHX_GEN_ALL_CORE_FT
PAST SURGICAL HISTORY:  H/O  section     History of appendectomy     History of resection of small bowel

## 2023-07-23 NOTE — H&P ADULT - ASSESSMENT
33F s/p C section and subsequent re-opening of incision for appendectomy and SBR now presents with 4 days of abdominal pain, now persistent and associated with N/V. No leukocytosis or elevated lactate on admission, CT with evidence of high grade SBO at SBR anastomosis site with dilated, fluid filled proximal bowel. Patient will benefit from admission.    Plan:  -admit to surgery, Dr. Navarrete  -NPO/IVF  -will defer NGT as no active emesis - will place should patient vomit again  -SAEs  -monitor for continued bowel function  -DVT ppx 33F s/p C section and subsequent re-opening of incision for appendectomy and SBR now presents with 4 days of abdominal pain, now persistent and associated with N/V. No leukocytosis or elevated lactate on admission, CT concerning for SBO however patient is not clinicall obstructed at this time. Patient will benefit from admission.    Plan:  -admit to surgery, Dr. Navarrete  -NPO/IVF  -will defer NGT as no active emesis - will place should patient vomit again  -SAEs  -monitor for continued bowel function  -DVT ppx 33F s/p C section and subsequent re-opening of incision for appendectomy and SBR now presents with 4 days of abdominal pain, now persistent and associated with N/V. No leukocytosis or elevated lactate on admission, CT concerning for SBO however patient is not clinically obstructed at this time. Patient will benefit from admission.    Plan:  -admit to surgery, Dr. Navarrete  -NPO/IVF  -will defer NGT as no active emesis - will place should patient vomit again  -SAEs  -monitor for continued bowel function  -DVT ppx

## 2023-07-23 NOTE — H&P ADULT - NSHPPHYSICALEXAM_GEN_ALL_CORE
Constitutional: NAD  HEENT: PERRLA, EOMI, no drainage or redness  Respiratory: respirations even, unlabored on room air  Cardiovascular: RRR  Gastrointestinal: soft, non-distended, mild TTP epigastrium; Pfannenstiel incision healing well w/o erythema or drainage  Extremities: No peripheral edema, No cyanosis, clubbing   Vascular: Equal and normal pulses: 2+ peripheral pulses throughout  Neurological: A&O x 3; no gross deficits  Psychiatric: Normal mood, normal affect  Musculoskeletal: No joint pain, swelling or deformity; no limitation of movement  Skin: No rashes

## 2023-07-23 NOTE — H&P ADULT - NSHPREVIEWOFSYSTEMS_GEN_ALL_CORE
REVIEW OF SYSTEMS:    CONSTITUTIONAL:  No weakness, fevers or chills  EYES/ENT:  No visual changes;  No vertigo or throat pain   NECK:  No pain or stiffness  RESPIRATORY:  No cough, wheezing, hemoptysis; No shortness of breath  CARDIOVASCULAR:  No chest pain or palpitations  GASTROINTESTINAL:  +upper midline abdominal pain, crampy; +N/V; No diarrhea or constipation. No melena or hematochezia.  GENITOURINARY:  No dysuria, frequency or hematuria  MUSCULOSKELETAL:  FROM all extremities, normal strength, No calf tenderness  NEUROLOGICAL:  No numbness or weakness  SKIN:  No itching, rashes

## 2023-07-23 NOTE — H&P ADULT - NSHPLABSRESULTS_GEN_ALL_CORE
11.2   10.25 )-----------( 775      ( 22 Jul 2023 23:35 )             35.3     07-22    137  |  100  |  12.6  ----------------------------<  90  4.3   |  25.0  |  0.76    Ca    9.1      22 Jul 2023 23:35  Mg     2.0     07-22    TPro  7.5  /  Alb  4.2  /  TBili  0.4  /  DBili  x   /  AST  15  /  ALT  16  /  AlkPhos  93  07-22

## 2023-07-24 LAB
ANION GAP SERPL CALC-SCNC: 13 MMOL/L — SIGNIFICANT CHANGE UP (ref 5–17)
BASOPHILS # BLD AUTO: 0.05 K/UL — SIGNIFICANT CHANGE UP (ref 0–0.2)
BASOPHILS NFR BLD AUTO: 0.8 % — SIGNIFICANT CHANGE UP (ref 0–2)
BUN SERPL-MCNC: 11.8 MG/DL — SIGNIFICANT CHANGE UP (ref 8–20)
CALCIUM SERPL-MCNC: 8.3 MG/DL — LOW (ref 8.4–10.5)
CHLORIDE SERPL-SCNC: 106 MMOL/L — SIGNIFICANT CHANGE UP (ref 96–108)
CO2 SERPL-SCNC: 23 MMOL/L — SIGNIFICANT CHANGE UP (ref 22–29)
CREAT SERPL-MCNC: 0.66 MG/DL — SIGNIFICANT CHANGE UP (ref 0.5–1.3)
EGFR: 119 ML/MIN/1.73M2 — SIGNIFICANT CHANGE UP
EOSINOPHIL # BLD AUTO: 0.15 K/UL — SIGNIFICANT CHANGE UP (ref 0–0.5)
EOSINOPHIL NFR BLD AUTO: 2.4 % — SIGNIFICANT CHANGE UP (ref 0–6)
GLUCOSE SERPL-MCNC: 64 MG/DL — LOW (ref 70–99)
HCT VFR BLD CALC: 31.8 % — LOW (ref 34.5–45)
HGB BLD-MCNC: 9.9 G/DL — LOW (ref 11.5–15.5)
IMM GRANULOCYTES NFR BLD AUTO: 0.3 % — SIGNIFICANT CHANGE UP (ref 0–0.9)
LYMPHOCYTES # BLD AUTO: 2.23 K/UL — SIGNIFICANT CHANGE UP (ref 1–3.3)
LYMPHOCYTES # BLD AUTO: 35.3 % — SIGNIFICANT CHANGE UP (ref 13–44)
MAGNESIUM SERPL-MCNC: 2 MG/DL — SIGNIFICANT CHANGE UP (ref 1.6–2.6)
MCHC RBC-ENTMCNC: 27.3 PG — SIGNIFICANT CHANGE UP (ref 27–34)
MCHC RBC-ENTMCNC: 31.1 GM/DL — LOW (ref 32–36)
MCV RBC AUTO: 87.8 FL — SIGNIFICANT CHANGE UP (ref 80–100)
MONOCYTES # BLD AUTO: 0.55 K/UL — SIGNIFICANT CHANGE UP (ref 0–0.9)
MONOCYTES NFR BLD AUTO: 8.7 % — SIGNIFICANT CHANGE UP (ref 2–14)
NEUTROPHILS # BLD AUTO: 3.32 K/UL — SIGNIFICANT CHANGE UP (ref 1.8–7.4)
NEUTROPHILS NFR BLD AUTO: 52.5 % — SIGNIFICANT CHANGE UP (ref 43–77)
PHOSPHATE SERPL-MCNC: 3.8 MG/DL — SIGNIFICANT CHANGE UP (ref 2.4–4.7)
PLATELET # BLD AUTO: 564 K/UL — HIGH (ref 150–400)
POTASSIUM SERPL-MCNC: 4.1 MMOL/L — SIGNIFICANT CHANGE UP (ref 3.5–5.3)
POTASSIUM SERPL-SCNC: 4.1 MMOL/L — SIGNIFICANT CHANGE UP (ref 3.5–5.3)
RBC # BLD: 3.62 M/UL — LOW (ref 3.8–5.2)
RBC # FLD: 15.2 % — HIGH (ref 10.3–14.5)
SODIUM SERPL-SCNC: 141 MMOL/L — SIGNIFICANT CHANGE UP (ref 135–145)
WBC # BLD: 6.32 K/UL — SIGNIFICANT CHANGE UP (ref 3.8–10.5)
WBC # FLD AUTO: 6.32 K/UL — SIGNIFICANT CHANGE UP (ref 3.8–10.5)

## 2023-07-24 PROCEDURE — 99231 SBSQ HOSP IP/OBS SF/LOW 25: CPT | Mod: 24

## 2023-07-24 RX ADMIN — ENOXAPARIN SODIUM 40 MILLIGRAM(S): 100 INJECTION SUBCUTANEOUS at 06:24

## 2023-07-24 NOTE — PROGRESS NOTE ADULT - NS ATTEND AMEND GEN_ALL_CORE FT
I have seen and examined this patient with the team.  No acute events overnight.  Patient appears well this morning.  States she has minimal abdominal pain.  Not passing flatus or BMs.    Now having nausea and belching.  Will obtain AXR.  If distended, patient will require an NGT.  Will keep NPO for now.  Will continue to monitor for now.

## 2023-07-24 NOTE — PROGRESS NOTE ADULT - SUBJECTIVE AND OBJECTIVE BOX
Patient seen and examined at bedside.     Vitals:    Labs:    Exam:  Gen: pt lying in bed, alert, in NAD  Resp: unlabored  CVS: RRR  Abd: soft, NT, ND  Ext: moving all extremities spontaneously, sensation intact, pulses 2+   Patient seen and examined at bedside. NAEON, pain is stable and well controlled. No n/v. Is reporting bowel function.     Vital Signs Last 24 Hrs  T(C): 36.4 (24 Jul 2023 10:13), Max: 36.7 (24 Jul 2023 04:14)  T(F): 97.5 (24 Jul 2023 10:13), Max: 98.1 (24 Jul 2023 04:14)  HR: 89 (24 Jul 2023 10:13) (68 - 89)  BP: 101/66 (24 Jul 2023 10:13) (100/66 - 120/77)  BP(mean): 81 (23 Jul 2023 20:00) (81 - 91)  RR: 18 (24 Jul 2023 10:13) (18 - 18)  SpO2: 97% (24 Jul 2023 10:13) (96% - 99%)    Parameters below as of 24 Jul 2023 10:13  Patient On (Oxygen Delivery Method): room air                          9.9    6.32  )-----------( 564      ( 24 Jul 2023 05:16 )             31.8   07-24    141  |  106  |  11.8  ----------------------------<  64<L>  4.1   |  23.0  |  0.66    Ca    8.3<L>      24 Jul 2023 05:16  Phos  3.8     07-24  Mg     2.0     07-24    TPro  7.5  /  Alb  4.2  /  TBili  0.4  /  DBili  x   /  AST  15  /  ALT  16  /  AlkPhos  93  07-22      Exam:  Gen: pt lying in bed, alert, in NAD  Resp: unlabored  CVS: RRR  Abd: soft, mild periumbilical ttp, mildly distended  Ext: moving all extremities spontaneously, sensation intact, pulses 2+

## 2023-07-24 NOTE — PROGRESS NOTE ADULT - ASSESSMENT
33F s/p C section and subsequent re-opening of incision for appendectomy and SBR now presents with 4 days of abdominal pain, now persistent and associated with N/V. No leukocytosis or elevated lactate on admission, CT concerning for SBO however patient is not clinically obstructed at this time. Patient will benefit from admission.    Plan:  *** 33F s/p C section and subsequent re-opening of incision for appendectomy and SBR now presents with 4 days of abdominal pain, now persistent and associated with N/V. No leukocytosis or elevated lactate on admission, CT concerning for SBO, however patient is currently reporting bowel function.    Plan:  -CT scan concerning for obstruction secondary to stricture given recent surgery  -Will follow bowel function closely, abdominal exam is reassuring at this time  -Plan for KUB this evening for decision about NGT   - multimodal pain control  -strict Is/Os  -trend labs, replete electrolytes as needed  -encourage OOB  -incentive spirometry  -DVT ppx: SCDs, Lovenox 40 daily   33F s/p C section and subsequent re-opening of incision for appendectomy and SBR now presents with 4 days of abdominal pain, now persistent and associated with N/V. No leukocytosis or elevated lactate on admission, CT concerning for SBO, however patient is currently reporting bowel function.    Plan:  -CT scan concerning for obstruction secondary to stricture given recent surgery  -Will follow bowel function closely, abdominal exam is reassuring at this time  -Plan for KUB this evening for decision about NGT   -NPO/IVF for now   - multimodal pain control  -strict Is/Os  -trend labs, replete electrolytes as needed  -encourage OOB  -incentive spirometry  -DVT ppx: SCDs, Lovenox 40 daily

## 2023-07-25 ENCOUNTER — APPOINTMENT (OUTPATIENT)
Dept: TRAUMA SURGERY | Facility: CLINIC | Age: 33
End: 2023-07-25

## 2023-07-25 LAB
ACETONE SERPL-MCNC: NEGATIVE — SIGNIFICANT CHANGE UP
ANION GAP SERPL CALC-SCNC: 14 MMOL/L — SIGNIFICANT CHANGE UP (ref 5–17)
ANION GAP SERPL CALC-SCNC: 18 MMOL/L — HIGH (ref 5–17)
B-OH-BUTYR SERPL-SCNC: 0.5 MMOL/L — HIGH
BASOPHILS # BLD AUTO: 0.05 K/UL — SIGNIFICANT CHANGE UP (ref 0–0.2)
BASOPHILS NFR BLD AUTO: 1 % — SIGNIFICANT CHANGE UP (ref 0–2)
BUN SERPL-MCNC: 11.2 MG/DL — SIGNIFICANT CHANGE UP (ref 8–20)
BUN SERPL-MCNC: 11.3 MG/DL — SIGNIFICANT CHANGE UP (ref 8–20)
CALCIUM SERPL-MCNC: 8.1 MG/DL — LOW (ref 8.4–10.5)
CALCIUM SERPL-MCNC: 8.7 MG/DL — SIGNIFICANT CHANGE UP (ref 8.4–10.5)
CHLORIDE SERPL-SCNC: 104 MMOL/L — SIGNIFICANT CHANGE UP (ref 96–108)
CHLORIDE SERPL-SCNC: 104 MMOL/L — SIGNIFICANT CHANGE UP (ref 96–108)
CO2 SERPL-SCNC: 17 MMOL/L — LOW (ref 22–29)
CO2 SERPL-SCNC: 22 MMOL/L — SIGNIFICANT CHANGE UP (ref 22–29)
CREAT SERPL-MCNC: 0.62 MG/DL — SIGNIFICANT CHANGE UP (ref 0.5–1.3)
CREAT SERPL-MCNC: 0.63 MG/DL — SIGNIFICANT CHANGE UP (ref 0.5–1.3)
EGFR: 120 ML/MIN/1.73M2 — SIGNIFICANT CHANGE UP
EGFR: 121 ML/MIN/1.73M2 — SIGNIFICANT CHANGE UP
EOSINOPHIL # BLD AUTO: 0.1 K/UL — SIGNIFICANT CHANGE UP (ref 0–0.5)
EOSINOPHIL NFR BLD AUTO: 1.9 % — SIGNIFICANT CHANGE UP (ref 0–6)
GLUCOSE BLDC GLUCOMTR-MCNC: 72 MG/DL — SIGNIFICANT CHANGE UP (ref 70–99)
GLUCOSE BLDC GLUCOMTR-MCNC: 88 MG/DL — SIGNIFICANT CHANGE UP (ref 70–99)
GLUCOSE SERPL-MCNC: 121 MG/DL — HIGH (ref 70–99)
GLUCOSE SERPL-MCNC: 54 MG/DL — CRITICAL LOW (ref 70–99)
HCT VFR BLD CALC: 31 % — LOW (ref 34.5–45)
HGB BLD-MCNC: 9.6 G/DL — LOW (ref 11.5–15.5)
IMM GRANULOCYTES NFR BLD AUTO: 0.2 % — SIGNIFICANT CHANGE UP (ref 0–0.9)
LACTATE SERPL-SCNC: 1.1 MMOL/L — SIGNIFICANT CHANGE UP (ref 0.5–2)
LYMPHOCYTES # BLD AUTO: 2.5 K/UL — SIGNIFICANT CHANGE UP (ref 1–3.3)
LYMPHOCYTES # BLD AUTO: 47.6 % — HIGH (ref 13–44)
MAGNESIUM SERPL-MCNC: 1.7 MG/DL — SIGNIFICANT CHANGE UP (ref 1.6–2.6)
MCHC RBC-ENTMCNC: 27.1 PG — SIGNIFICANT CHANGE UP (ref 27–34)
MCHC RBC-ENTMCNC: 31 GM/DL — LOW (ref 32–36)
MCV RBC AUTO: 87.6 FL — SIGNIFICANT CHANGE UP (ref 80–100)
MONOCYTES # BLD AUTO: 0.4 K/UL — SIGNIFICANT CHANGE UP (ref 0–0.9)
MONOCYTES NFR BLD AUTO: 7.6 % — SIGNIFICANT CHANGE UP (ref 2–14)
NEUTROPHILS # BLD AUTO: 2.19 K/UL — SIGNIFICANT CHANGE UP (ref 1.8–7.4)
NEUTROPHILS NFR BLD AUTO: 41.7 % — LOW (ref 43–77)
PHOSPHATE SERPL-MCNC: 3.1 MG/DL — SIGNIFICANT CHANGE UP (ref 2.4–4.7)
PLATELET # BLD AUTO: 541 K/UL — HIGH (ref 150–400)
POTASSIUM SERPL-MCNC: 4.2 MMOL/L — SIGNIFICANT CHANGE UP (ref 3.5–5.3)
POTASSIUM SERPL-MCNC: 4.2 MMOL/L — SIGNIFICANT CHANGE UP (ref 3.5–5.3)
POTASSIUM SERPL-SCNC: 4.2 MMOL/L — SIGNIFICANT CHANGE UP (ref 3.5–5.3)
POTASSIUM SERPL-SCNC: 4.2 MMOL/L — SIGNIFICANT CHANGE UP (ref 3.5–5.3)
RBC # BLD: 3.54 M/UL — LOW (ref 3.8–5.2)
RBC # FLD: 14.8 % — HIGH (ref 10.3–14.5)
SODIUM SERPL-SCNC: 139 MMOL/L — SIGNIFICANT CHANGE UP (ref 135–145)
SODIUM SERPL-SCNC: 139 MMOL/L — SIGNIFICANT CHANGE UP (ref 135–145)
WBC # BLD: 5.25 K/UL — SIGNIFICANT CHANGE UP (ref 3.8–10.5)
WBC # FLD AUTO: 5.25 K/UL — SIGNIFICANT CHANGE UP (ref 3.8–10.5)

## 2023-07-25 PROCEDURE — 99231 SBSQ HOSP IP/OBS SF/LOW 25: CPT | Mod: 24

## 2023-07-25 RX ORDER — MAGNESIUM SULFATE 500 MG/ML
2 VIAL (ML) INJECTION ONCE
Refills: 0 | Status: COMPLETED | OUTPATIENT
Start: 2023-07-25 | End: 2023-07-25

## 2023-07-25 RX ORDER — SODIUM CHLORIDE 9 MG/ML
1000 INJECTION, SOLUTION INTRAVENOUS
Refills: 0 | Status: DISCONTINUED | OUTPATIENT
Start: 2023-07-25 | End: 2023-07-26

## 2023-07-25 RX ADMIN — Medication 25 GRAM(S): at 08:39

## 2023-07-25 RX ADMIN — SODIUM CHLORIDE 100 MILLILITER(S): 9 INJECTION, SOLUTION INTRAVENOUS at 08:09

## 2023-07-25 RX ADMIN — SODIUM CHLORIDE 100 MILLILITER(S): 9 INJECTION, SOLUTION INTRAVENOUS at 07:41

## 2023-07-25 RX ADMIN — ENOXAPARIN SODIUM 40 MILLIGRAM(S): 100 INJECTION SUBCUTANEOUS at 06:00

## 2023-07-25 NOTE — PROGRESS NOTE ADULT - ASSESSMENT
32 yo female with resolving SBO  - cont IVF  - start clear liquids in am  - am labs  - cont oob/ambulate  - d/c planning once BM, and tolerates diet

## 2023-07-25 NOTE — PROGRESS NOTE ADULT - SUBJECTIVE AND OBJECTIVE BOX
Subjective:33yFemale s/p recent , open appendectomy, SB resection, SBO this admission.  Pt feeling better.  Pt appears comfortable, +flatus, no nausea or vomiting.        Vital Signs Last 24 Hrs  T(C): 36.6 (2023 23:37), Max: 36.7 (2023 04:14)  T(F): 97.9 (2023 23:37), Max: 98.1 (2023 04:14)  HR: 71 (2023 23:37) (63 - 89)  BP: 114/61 (2023 23:37) (100/66 - 114/61)  BP(mean): --  RR: 18 (2023 23:37) (18 - 18)  SpO2: 97% (2023 23:37) (96% - 98%)    Parameters below as of 2023 23:37  Patient On (Oxygen Delivery Method): room air      I&O's Detail    2023 07:01  -  2023 07:00  --------------------------------------------------------  IN:    sodium chloride 0.9%: 1125 mL  Total IN: 1125 mL    OUT:  Total OUT: 0 mL    Total NET: 1125 mL      2023 07:01  -  2023 03:39  --------------------------------------------------------  IN:    sodium chloride 0.9%: 1000 mL  Total IN: 1000 mL    OUT:  Total OUT: 0 mL    Total NET: 1000 mL          Labs:                        9.9    6.32  )-----------( 564      ( 2023 05:16 )             31.8         141  |  106  |  11.8  ----------------------------<  64<L>  4.1   |  23.0  |  0.66    Ca    8.3<L>      2023 05:16  Phos  3.8     07-24  Mg     2.0     -

## 2023-07-26 ENCOUNTER — TRANSCRIPTION ENCOUNTER (OUTPATIENT)
Age: 33
End: 2023-07-26

## 2023-07-26 VITALS
HEART RATE: 79 BPM | RESPIRATION RATE: 18 BRPM | WEIGHT: 190.26 LBS | OXYGEN SATURATION: 96 % | TEMPERATURE: 98 F | SYSTOLIC BLOOD PRESSURE: 115 MMHG | DIASTOLIC BLOOD PRESSURE: 76 MMHG

## 2023-07-26 LAB
ANION GAP SERPL CALC-SCNC: 11 MMOL/L — SIGNIFICANT CHANGE UP (ref 5–17)
BASOPHILS # BLD AUTO: 0.04 K/UL — SIGNIFICANT CHANGE UP (ref 0–0.2)
BASOPHILS NFR BLD AUTO: 0.7 % — SIGNIFICANT CHANGE UP (ref 0–2)
BUN SERPL-MCNC: 13.2 MG/DL — SIGNIFICANT CHANGE UP (ref 8–20)
CALCIUM SERPL-MCNC: 8.9 MG/DL — SIGNIFICANT CHANGE UP (ref 8.4–10.5)
CHLORIDE SERPL-SCNC: 106 MMOL/L — SIGNIFICANT CHANGE UP (ref 96–108)
CO2 SERPL-SCNC: 24 MMOL/L — SIGNIFICANT CHANGE UP (ref 22–29)
CREAT SERPL-MCNC: 0.77 MG/DL — SIGNIFICANT CHANGE UP (ref 0.5–1.3)
EGFR: 104 ML/MIN/1.73M2 — SIGNIFICANT CHANGE UP
EOSINOPHIL # BLD AUTO: 0.12 K/UL — SIGNIFICANT CHANGE UP (ref 0–0.5)
EOSINOPHIL NFR BLD AUTO: 2.1 % — SIGNIFICANT CHANGE UP (ref 0–6)
GLUCOSE SERPL-MCNC: 81 MG/DL — SIGNIFICANT CHANGE UP (ref 70–99)
HCT VFR BLD CALC: 32 % — LOW (ref 34.5–45)
HGB BLD-MCNC: 10 G/DL — LOW (ref 11.5–15.5)
IMM GRANULOCYTES NFR BLD AUTO: 0.4 % — SIGNIFICANT CHANGE UP (ref 0–0.9)
LYMPHOCYTES # BLD AUTO: 2.04 K/UL — SIGNIFICANT CHANGE UP (ref 1–3.3)
LYMPHOCYTES # BLD AUTO: 36.2 % — SIGNIFICANT CHANGE UP (ref 13–44)
MAGNESIUM SERPL-MCNC: 1.9 MG/DL — SIGNIFICANT CHANGE UP (ref 1.8–2.6)
MCHC RBC-ENTMCNC: 27.3 PG — SIGNIFICANT CHANGE UP (ref 27–34)
MCHC RBC-ENTMCNC: 31.3 GM/DL — LOW (ref 32–36)
MCV RBC AUTO: 87.4 FL — SIGNIFICANT CHANGE UP (ref 80–100)
MONOCYTES # BLD AUTO: 0.54 K/UL — SIGNIFICANT CHANGE UP (ref 0–0.9)
MONOCYTES NFR BLD AUTO: 9.6 % — SIGNIFICANT CHANGE UP (ref 2–14)
NEUTROPHILS # BLD AUTO: 2.88 K/UL — SIGNIFICANT CHANGE UP (ref 1.8–7.4)
NEUTROPHILS NFR BLD AUTO: 51 % — SIGNIFICANT CHANGE UP (ref 43–77)
PHOSPHATE SERPL-MCNC: 3.8 MG/DL — SIGNIFICANT CHANGE UP (ref 2.4–4.7)
PLATELET # BLD AUTO: 513 K/UL — HIGH (ref 150–400)
POTASSIUM SERPL-MCNC: 4.3 MMOL/L — SIGNIFICANT CHANGE UP (ref 3.5–5.3)
POTASSIUM SERPL-SCNC: 4.3 MMOL/L — SIGNIFICANT CHANGE UP (ref 3.5–5.3)
RBC # BLD: 3.66 M/UL — LOW (ref 3.8–5.2)
RBC # FLD: 15.3 % — HIGH (ref 10.3–14.5)
SODIUM SERPL-SCNC: 140 MMOL/L — SIGNIFICANT CHANGE UP (ref 135–145)
WBC # BLD: 5.64 K/UL — SIGNIFICANT CHANGE UP (ref 3.8–10.5)
WBC # FLD AUTO: 5.64 K/UL — SIGNIFICANT CHANGE UP (ref 3.8–10.5)

## 2023-07-26 PROCEDURE — 80048 BASIC METABOLIC PNL TOTAL CA: CPT

## 2023-07-26 PROCEDURE — 84702 CHORIONIC GONADOTROPIN TEST: CPT

## 2023-07-26 PROCEDURE — 93005 ELECTROCARDIOGRAM TRACING: CPT

## 2023-07-26 PROCEDURE — 83690 ASSAY OF LIPASE: CPT

## 2023-07-26 PROCEDURE — 82010 KETONE BODYS QUAN: CPT

## 2023-07-26 PROCEDURE — 82962 GLUCOSE BLOOD TEST: CPT

## 2023-07-26 PROCEDURE — 85025 COMPLETE CBC W/AUTO DIFF WBC: CPT

## 2023-07-26 PROCEDURE — 80053 COMPREHEN METABOLIC PANEL: CPT

## 2023-07-26 PROCEDURE — 96374 THER/PROPH/DIAG INJ IV PUSH: CPT

## 2023-07-26 PROCEDURE — 83735 ASSAY OF MAGNESIUM: CPT

## 2023-07-26 PROCEDURE — 99231 SBSQ HOSP IP/OBS SF/LOW 25: CPT

## 2023-07-26 PROCEDURE — T1013: CPT

## 2023-07-26 PROCEDURE — 83605 ASSAY OF LACTIC ACID: CPT

## 2023-07-26 PROCEDURE — 96375 TX/PRO/DX INJ NEW DRUG ADDON: CPT

## 2023-07-26 PROCEDURE — 36415 COLL VENOUS BLD VENIPUNCTURE: CPT

## 2023-07-26 PROCEDURE — 82009 KETONE BODYS QUAL: CPT

## 2023-07-26 PROCEDURE — 74177 CT ABD & PELVIS W/CONTRAST: CPT | Mod: MD

## 2023-07-26 PROCEDURE — 84100 ASSAY OF PHOSPHORUS: CPT

## 2023-07-26 PROCEDURE — 99285 EMERGENCY DEPT VISIT HI MDM: CPT

## 2023-07-26 RX ADMIN — ENOXAPARIN SODIUM 40 MILLIGRAM(S): 100 INJECTION SUBCUTANEOUS at 05:22

## 2023-07-26 NOTE — PROGRESS NOTE ADULT - ATTENDING COMMENTS
Seen and examined on AM rounds.   Denies pain. Tolerating diet and having bowel function.   Acceptable labs.     --Ok to dc today; to f/u in [facility] w/in 2 weeks.

## 2023-07-26 NOTE — DISCHARGE NOTE PROVIDER - NSDCCPCAREPLAN_GEN_ALL_CORE_FT
PRINCIPAL DISCHARGE DIAGNOSIS  Diagnosis: Bowel obstruction  Assessment and Plan of Treatment: Follow Up: Please call to make an appointment with your primary care physician as per your usual schedule.   Activity: May return to normal activities as tolerated.  Diet: May continue regular diet.  Medications: Please take all home medications as prescribed by your primary care doctor. You are encouraged to take over-the-counter tylenol and/or ibuprofen for pain relief  Patient is advised to RETURN TO THE EMERGENCY DEPARTMENT for any of the following - worsening pain, fever/chills, nausea/vomiting, alterned mental status, chest pain, shortness of breath, or any other new/worsening symptoms.

## 2023-07-26 NOTE — DISCHARGE NOTE NURSING/CASE MANAGEMENT/SOCIAL WORK - PATIENT PORTAL LINK FT
You can access the FollowMyHealth Patient Portal offered by [facility] by registering at the following website: [email or web address]. By joining [employer]’s FollowMyHealth portal, you will also be able to view your health information using other applications (apps) compatible with our system.

## 2023-07-26 NOTE — DISCHARGE NOTE NURSING/CASE MANAGEMENT/SOCIAL WORK - NSDCVIVACCINE_GEN_ALL_CORE_FT
MMR; 21-May-2023 15:31; Aminata Brock (RN); [employer] [id number] (Exp. Date: 07-Jan-2024); SubCutaneous; Arm Left; 0.5 milliLiter(s); VIS (VIS Published: 20-Apr-2012, VIS Presented: 21-May-2023);

## 2023-07-26 NOTE — PROGRESS NOTE ADULT - SUBJECTIVE AND OBJECTIVE BOX
SUBJECTIVE/24 hour events:  Patient is a 33yFemale, well known to [facility],  s/p recent , open appendectomy, SB resection, SBO this admission. Patient with no acute events overnight, advanced to regular diet and tolerating, no pain issues, voiding, having Flatus.   Pt states "I'm feeling better" " I ate chicken and sweet potatoes".  Patient noted to have an acidosis on am bmp, started on d5/ns and on repeat labs resolved, will f/u am labs     Vital Signs Last 24 Hrs  T(C): 36.6 (2023 23:57), Max: 36.9 (2023 16:36)  T(F): 97.9 (2023 23:57), Max: 98.4 (2023 16:36)  HR: 60 (2023 23:57) (60 - 86)  BP: 110/68 (2023 23:57) (102/66 - 115/78)  BP(mean): --  RR: 18 (2023 23:57) (18 - 18)  SpO2: 98% (2023 23:57) (96% - 99%)    Parameters below as of 2023 23:57  Patient On (Oxygen Delivery Method): room air      Drug Dosing Weight  Height (cm): 152.4 (2023 21:46)  Weight (kg): 86.4 (2023 21:46)  BMI (kg/m2): 37.2 (2023 21:46)  BSA (m2): 1.83 (2023 21:46)  I&O's Detail    2023 07:01  -  2023 07:00  --------------------------------------------------------  IN:    sodium chloride 0.9%: 1000 mL  Total IN: 1000 mL    OUT:  Total OUT: 0 mL    Total NET: 1000 mL        Allergies    No Known Allergies    Intolerances                              9.6    5.25  )-----------( 541      ( 2023 04:55 )             31.0   07-25    139  |  104  |  11.3  ----------------------------<  121<H>  4.2   |  22.0  |  0.63    Ca    8.7      2023 16:50  Phos  3.1       Mg     1.7             ROS:    PHYSICAL EXAM:  Constitutional: in good spirits   Respiratory: no respiratory distress, no dyspnea, no supplemental o2  Gastrointestinal: abdomen softly distended, no guarding,  Pfannenstiel incision with mild serosanguinous drainage, abd pad placed  Genitourinary: voiding   Neurological: A&OX3          MEDICATIONS  (STANDING):  dextrose 5% + sodium chloride 0.9%. 1000 milliLiter(s) (100 mL/Hr) IV Continuous <Continuous>  enoxaparin Injectable 40 milliGRAM(s) SubCutaneous every 24 hours    MEDICATIONS  (PRN):  acetaminophen   IVPB .. 1000 milliGRAM(s) IV Intermittent every 6 hours PRN Moderate Pain (4 - 6)  ketorolac   Injectable 15 milliGRAM(s) IV Push every 6 hours PRN Severe Pain (7 - 10)  ondansetron Injectable 4 milliGRAM(s) IV Push every 6 hours PRN Nausea and/or Vomiting      RADIOLOGY STUDIES:    CULTURES:         SUBJECTIVE/24 hour events:  Patient is a 33yFemale, well known to [facility],  s/p recent , open appendectomy, SB resection, SBO this admission. Patient with no acute events overnight, advanced to regular diet and tolerating, no pain issues, voiding, having Flatus.   Pt states "I'm feeling better" " I ate chicken and sweet potatoes".  Patient noted to have an acidosis on am bmp, started on d5/ns and on repeat labs - now resolved, will f/u am labs     Vital Signs Last 24 Hrs  T(C): 36.6 (2023 23:57), Max: 36.9 (2023 16:36)  T(F): 97.9 (2023 23:57), Max: 98.4 (2023 16:36)  HR: 60 (2023 23:57) (60 - 86)  BP: 110/68 (2023 23:57) (102/66 - 115/78)  BP(mean): --  RR: 18 (2023 23:57) (18 - 18)  SpO2: 98% (2023 23:57) (96% - 99%)    Parameters below as of 2023 23:57  Patient On (Oxygen Delivery Method): room air      Drug Dosing Weight  Height (cm): 152.4 (2023 21:46)  Weight (kg): 86.4 (2023 21:46)  BMI (kg/m2): 37.2 (2023 21:46)  BSA (m2): 1.83 (2023 21:46)  I&O's Detail    2023 07:01  -  2023 07:00  --------------------------------------------------------  IN:    sodium chloride 0.9%: 1000 mL  Total IN: 1000 mL    OUT:  Total OUT: 0 mL    Total NET: 1000 mL        Allergies    No Known Allergies    Intolerances                              9.6    5.25  )-----------( 541      ( 2023 04:55 )             31.0   07-25    139  |  104  |  11.3  ----------------------------<  121<H>  4.2   |  22.0  |  0.63    Ca    8.7      2023 16:50  Phos  3.1       Mg     1.7             ROS:    PHYSICAL EXAM:  Constitutional: in good spirits   Respiratory: no respiratory distress, no dyspnea, no supplemental o2  Gastrointestinal: abdomen softly distended, no guarding,  Pfannenstiel incision with mild serosanguinous drainage, abd pad placed  Genitourinary: voiding   Neurological: A&OX3          MEDICATIONS  (STANDING):  dextrose 5% + sodium chloride 0.9%. 1000 milliLiter(s) (100 mL/Hr) IV Continuous <Continuous>  enoxaparin Injectable 40 milliGRAM(s) SubCutaneous every 24 hours    MEDICATIONS  (PRN):  acetaminophen   IVPB .. 1000 milliGRAM(s) IV Intermittent every 6 hours PRN Moderate Pain (4 - 6)  ketorolac   Injectable 15 milliGRAM(s) IV Push every 6 hours PRN Severe Pain (7 - 10)  ondansetron Injectable 4 milliGRAM(s) IV Push every 6 hours PRN Nausea and/or Vomiting      RADIOLOGY STUDIES:    CULTURES:

## 2023-07-26 NOTE — DISCHARGE NOTE PROVIDER - HOSPITAL COURSE
HPI: 33F with PMH/PSH  who presented on 7/3/23 with wound infection and sepsis, found to have appendix and small bowel caught in closure, now presenting with abdominal pain, nausea, and vomiting. Patient reports 4 days of intermittent abdominal pain in the upper midline which has worsened and become persistent over the past day. She reports that yesterday afternoon she started experiencing nausea and has had several episodes of emesis, last around 0200 this morning. She continues to pass flatus, last bowel movement late yesterday though notes it was very small. Last PO intake yesterday afternoon several hours prior to onset of N/V. Denies subjective fevers and chills, CP, SOB.    Hospital Course: Patient was admitted to the Surgical service under Dr. Navarrete for SBO. Non-operative management was performed. No nausea or vomiting and no NGT was placed. Patient was kept NPO w/ IVF. Patient began having bowel function on her own without intervention. Diet was advanced and well tolerated. Hospital course was complicated by starvation ketosis. Gap closed with glucose solutions while NPO. Tolerating regular diet well. Voiding spontaneously. OOB and ambulatory on own. Pain was well controlled at time of discharge. Patient was stable for discharge home.

## 2023-07-26 NOTE — PROGRESS NOTE ADULT - ASSESSMENT
34 yo female with resolving SBO, having bowel function, advanced to regular and tolerating well. Noted to have an acidosis on am BMP, started on D5/.NS and on repeat labs resolved  - cont IVF  - continue regular diet  - f/u am labs  - cont oob/ambulate  - likely dc after am labs results and continues to tolerate a regular diet

## 2023-07-28 ENCOUNTER — INPATIENT (INPATIENT)
Facility: HOSPITAL | Age: 33
LOS: 2 days | Discharge: HOME | DRG: 390 | End: 2023-07-31
Attending: SURGERY | Admitting: SURGERY
Payer: MEDICAID

## 2023-07-28 VITALS
HEART RATE: 66 BPM | RESPIRATION RATE: 18 BRPM | WEIGHT: 186.95 LBS | TEMPERATURE: 98 F | DIASTOLIC BLOOD PRESSURE: 89 MMHG | OXYGEN SATURATION: 100 % | SYSTOLIC BLOOD PRESSURE: 132 MMHG | HEIGHT: 60 IN

## 2023-07-28 DIAGNOSIS — Z98.891 HISTORY OF UTERINE SCAR FROM PREVIOUS SURGERY: Chronic | ICD-10-CM

## 2023-07-28 DIAGNOSIS — Z90.49 ACQUIRED ABSENCE OF OTHER SPECIFIED PARTS OF DIGESTIVE TRACT: Chronic | ICD-10-CM

## 2023-07-28 LAB
BASE EXCESS BLDV CALC-SCNC: 3.5 MMOL/L — HIGH (ref -2–3)
BASOPHILS # BLD AUTO: 0.04 K/UL — SIGNIFICANT CHANGE UP (ref 0–0.2)
BASOPHILS NFR BLD AUTO: 0.4 % — SIGNIFICANT CHANGE UP (ref 0–2)
CA-I SERPL-SCNC: 1.17 MMOL/L — SIGNIFICANT CHANGE UP (ref 1.15–1.33)
CHLORIDE BLDV-SCNC: 102 MMOL/L — SIGNIFICANT CHANGE UP (ref 96–108)
EOSINOPHIL # BLD AUTO: 0.08 K/UL — SIGNIFICANT CHANGE UP (ref 0–0.5)
EOSINOPHIL NFR BLD AUTO: 0.8 % — SIGNIFICANT CHANGE UP (ref 0–6)
GAS PNL BLDV: 136 MMOL/L — SIGNIFICANT CHANGE UP (ref 136–145)
GAS PNL BLDV: SIGNIFICANT CHANGE UP
GLUCOSE BLDV-MCNC: 96 MG/DL — SIGNIFICANT CHANGE UP (ref 70–99)
HCO3 BLDV-SCNC: 28 MMOL/L — SIGNIFICANT CHANGE UP (ref 22–29)
HCT VFR BLD CALC: 36.2 % — SIGNIFICANT CHANGE UP (ref 34.5–45)
HCT VFR BLDA CALC: 36 % — SIGNIFICANT CHANGE UP
HGB BLD CALC-MCNC: 11.9 G/DL — SIGNIFICANT CHANGE UP (ref 11.7–16.1)
HGB BLD-MCNC: 11.5 G/DL — SIGNIFICANT CHANGE UP (ref 11.5–15.5)
IMM GRANULOCYTES NFR BLD AUTO: 0.3 % — SIGNIFICANT CHANGE UP (ref 0–0.9)
LACTATE BLDV-MCNC: 1.1 MMOL/L — SIGNIFICANT CHANGE UP (ref 0.5–2)
LIDOCAIN IGE QN: 21 U/L — LOW (ref 22–51)
LYMPHOCYTES # BLD AUTO: 2.09 K/UL — SIGNIFICANT CHANGE UP (ref 1–3.3)
LYMPHOCYTES # BLD AUTO: 20.7 % — SIGNIFICANT CHANGE UP (ref 13–44)
MCHC RBC-ENTMCNC: 27.1 PG — SIGNIFICANT CHANGE UP (ref 27–34)
MCHC RBC-ENTMCNC: 31.8 GM/DL — LOW (ref 32–36)
MCV RBC AUTO: 85.2 FL — SIGNIFICANT CHANGE UP (ref 80–100)
MONOCYTES # BLD AUTO: 0.65 K/UL — SIGNIFICANT CHANGE UP (ref 0–0.9)
MONOCYTES NFR BLD AUTO: 6.4 % — SIGNIFICANT CHANGE UP (ref 2–14)
NEUTROPHILS # BLD AUTO: 7.22 K/UL — SIGNIFICANT CHANGE UP (ref 1.8–7.4)
NEUTROPHILS NFR BLD AUTO: 71.4 % — SIGNIFICANT CHANGE UP (ref 43–77)
PCO2 BLDV: 47 MMHG — HIGH (ref 39–42)
PH BLDV: 7.39 — SIGNIFICANT CHANGE UP (ref 7.32–7.43)
PLATELET # BLD AUTO: 529 K/UL — HIGH (ref 150–400)
PO2 BLDV: <42 MMHG — SIGNIFICANT CHANGE UP (ref 25–45)
POTASSIUM BLDV-SCNC: 4.1 MMOL/L — SIGNIFICANT CHANGE UP (ref 3.5–5.1)
RBC # BLD: 4.25 M/UL — SIGNIFICANT CHANGE UP (ref 3.8–5.2)
RBC # FLD: 15.5 % — HIGH (ref 10.3–14.5)
SAO2 % BLDV: 60.7 % — SIGNIFICANT CHANGE UP
WBC # BLD: 10.11 K/UL — SIGNIFICANT CHANGE UP (ref 3.8–10.5)
WBC # FLD AUTO: 10.11 K/UL — SIGNIFICANT CHANGE UP (ref 3.8–10.5)

## 2023-07-28 PROCEDURE — 99285 EMERGENCY DEPT VISIT HI MDM: CPT

## 2023-07-28 RX ORDER — IOHEXOL 300 MG/ML
30 INJECTION, SOLUTION INTRAVENOUS ONCE
Refills: 0 | Status: COMPLETED | OUTPATIENT
Start: 2023-07-28 | End: 2023-07-28

## 2023-07-28 RX ORDER — SODIUM CHLORIDE 9 MG/ML
2000 INJECTION INTRAMUSCULAR; INTRAVENOUS; SUBCUTANEOUS ONCE
Refills: 0 | Status: COMPLETED | OUTPATIENT
Start: 2023-07-28 | End: 2023-07-28

## 2023-07-28 RX ORDER — MORPHINE SULFATE 50 MG/1
4 CAPSULE, EXTENDED RELEASE ORAL ONCE
Refills: 0 | Status: DISCONTINUED | OUTPATIENT
Start: 2023-07-28 | End: 2023-07-28

## 2023-07-28 RX ORDER — ONDANSETRON 8 MG/1
4 TABLET, FILM COATED ORAL ONCE
Refills: 0 | Status: COMPLETED | OUTPATIENT
Start: 2023-07-28 | End: 2023-07-28

## 2023-07-28 RX ADMIN — ONDANSETRON 4 MILLIGRAM(S): 8 TABLET, FILM COATED ORAL at 22:58

## 2023-07-28 RX ADMIN — IOHEXOL 30 MILLILITER(S): 300 INJECTION, SOLUTION INTRAVENOUS at 23:00

## 2023-07-28 RX ADMIN — MORPHINE SULFATE 4 MILLIGRAM(S): 50 CAPSULE, EXTENDED RELEASE ORAL at 22:59

## 2023-07-28 RX ADMIN — SODIUM CHLORIDE 2000 MILLILITER(S): 9 INJECTION INTRAMUSCULAR; INTRAVENOUS; SUBCUTANEOUS at 22:59

## 2023-07-28 NOTE — ED PROVIDER NOTE - CLINICAL SUMMARY MEDICAL DECISION MAKING FREE TEXT BOX
33-year-old female with past medical history of recent SBO,  complicated by wound infection, sepsis, presenting with nausea and vomiting.  Abdomen soft, nondistended, however diffusely tender.  Passing gas and having bowel movements.  Discussed with surgery, who is recommending CT abdomen pelvis with oral contrast as well as IV contrast, will provide pain control, IV fluids, antiemetic, and reassess.  Surgery evaluated patient and will follow up CT.

## 2023-07-28 NOTE — CONSULT NOTE ADULT - ATTENDING COMMENTS
Patient presents w/ abdominal pain with workup concerning for potential persistent/resolving transition point at recent anastomosis. Potentially resolving edema at anastomosis vs. tightness/stricture. However having bowel function and clinically without evidence of obstruction a this time.   --NPO for now.   --To be re-evaluate in AM: may then proceed with diet trial if current symptoms are resolving; otherwise may require further workup.

## 2023-07-28 NOTE — ED PROVIDER NOTE - ATTENDING APP SHARED VISIT CONTRIBUTION OF CARE
I, Gianna Conley, performed a face to face bedside interview with this patient regarding history of present illness, review of symptoms and relevant past medical, social and family history.  I completed an independent physical examination. Medical decision making, follow-up on ordered tests (ie labs, radiologic studies) and re-evaluation of the patient's status has been communicated to the ACP.  Disposition of the patient will be based on test outcome and response to ED interventions.

## 2023-07-28 NOTE — ED PROVIDER NOTE - PROGRESS NOTE DETAILS
MARGIE GILLETTE, patient reports no BM X 1 day, pain improving 10/10 pain, pain comes and goes rates it as 2/10->9/10.  Patient reports has yet to be evaluated by surgery for a definitive plan.  She was recently admitted for 4 days for similar symptoms, was treated conservatively for partial SBO.  She reports once discharged pain continued Wed/Thur/Fri she notes vomiting caused her pain to subside.  Patient notes vomiting yesterday, everything that she ate that did not help the pain.  May 28 ha , July 3 also had surgery to remove appendix, and had infection to the bowel had piece removed.

## 2023-07-28 NOTE — CONSULT NOTE ADULT - ASSESSMENT
33 year old F, 33F with PMH/PSH  who presented on 7/3/23 with wound infection and sepsis, found to have appendix and small bowel caught in closure, recently admitted with N/V and was diagnosed with a SBO, resolved with non-surgical management, represents today with abdominal pain associated with nausea and vomiting, she is not clinically obstructed.    >>> 33 year old F, 33F with PMH/PSH  who presented on 7/3/23 with wound infection and sepsis, found to have appendix and small bowel caught in closure, recently admitted with N/V and was diagnosed with a SBO, resolved with non-surgical management, represents today with abdominal pain associated with nausea and vomiting, she is not clinically obstructed.  Her work up showed normal lab values, a CT Findings compatible with early or partial small bowel obstruction, with site of transition at the small bowel anastomosis, at the same location as previously noted high-grade small bowel obstruction, again she is not clinically obstructed    Recommendations:   NPO for now, IVF, pain control   Surgery will evaluate this morning, plan pending clinical course

## 2023-07-28 NOTE — CONSULT NOTE ADULT - SUBJECTIVE AND OBJECTIVE BOX
SURGERY CONSULT    HPI:  33 year old F, 33F with PMH/PSH  who presented on 7/3/23 with wound infection and sepsis, found to have appendix and small bowel caught in closure, recently admitted with N/V and was diagnosed with a SBO, resolved with non-surgical management, represents today with abdominal pain associated with nausea and vomiting, she is passing flatus and stool, no fever or chills, no additional complaints.      PAST MEDICAL HISTORY:  Fibroids    PAST SURGICAL HISTORY:  H/O  section    History of appendectomy    History of resection of small bowel    ALLERGIES:  No Known Allergies    FAMILY HISTORY: Noncontributory    SOCIAL HISTORY: Denies tobacco, EtOH, illicit substance use.     HOME MEDICATIONS:    MEDICATIONS  (STANDING):    MEDICATIONS  (PRN):    VITALS & I/Os:  Vital Signs Last 24 Hrs  T(C): 36.9 (2023 21:42), Max: 36.9 (2023 21:42)  T(F): 98.5 (2023 21:42), Max: 98.5 (2023 21:42)  HR: 66 (2023 21:42) (66 - 66)  BP: 132/89 (2023 21:42) (132/89 - 132/89)  BP(mean): --  RR: 18 (2023 21:42) (18 - 18)  SpO2: 100% (2023 21:42) (100% - 100%)    Parameters below as of 2023 21:42  Patient On (Oxygen Delivery Method): room air    CAPILLARY BLOOD GLUCOSE    I&O's Summary    GENERAL: Alert, well developed, in no acute distress.  RESPIRATORY: Nonlabored   CARDIOVASCULAR: Normotensive   GASTROINTESTINAL: Abdomen soft, tender in epigastric region, ND, -R/-G      LABS:                        11.5   10.11 )-----------( 529      ( 2023 23:01 )             36.2     Lactate:      - @ 23:01  1.10    IMAGING:   SURGERY CONSULT    HPI:  33 year old F, 33F with PMH/PSH  who presented on 7/3/23 with wound infection and sepsis, found to have appendix and small bowel caught in closure, recently admitted with N/V and was diagnosed with a SBO, resolved with non-surgical management, represents today with abdominal pain associated with nausea and vomiting, she is passing flatus and stool, no fever or chills, no additional complaints.      PAST MEDICAL HISTORY:  Fibroids    PAST SURGICAL HISTORY:  H/O  section    History of appendectomy    History of resection of small bowel    ALLERGIES:  No Known Allergies    FAMILY HISTORY: Noncontributory    SOCIAL HISTORY: Denies tobacco, EtOH, illicit substance use.     HOME MEDICATIONS:    MEDICATIONS  (STANDING):    MEDICATIONS  (PRN):    VITALS & I/Os:  Vital Signs Last 24 Hrs  T(C): 36.9 (2023 21:42), Max: 36.9 (2023 21:42)  T(F): 98.5 (2023 21:42), Max: 98.5 (2023 21:42)  HR: 66 (2023 21:42) (66 - 66)  BP: 132/89 (2023 21:42) (132/89 - 132/89)  BP(mean): --  RR: 18 (2023 21:42) (18 - 18)  SpO2: 100% (2023 21:42) (100% - 100%)    Parameters below as of 2023 21:42  Patient On (Oxygen Delivery Method): room air    CAPILLARY BLOOD GLUCOSE    I&O's Summary    GENERAL: Alert, well developed, in no acute distress.  RESPIRATORY: Nonlabored   CARDIOVASCULAR: Normotensive   GASTROINTESTINAL: Abdomen soft, tender in epigastric region, ND, -R/-G      LABS:                        11.5   10.11 )-----------( 529      ( 2023 23:01 )             36.2     Lactate:      - @ 23:01  1.10    IMAGING:  FINDINGS:  LOWER CHEST: Within normal limits.    LIVER: Within normal limits.  BILE DUCTS: Normal caliber.  GALLBLADDER: Within normal limits.  SPLEEN: Within normal limits.  PANCREAS: Within normal limits.  ADRENALS: Within normal limits.  KIDNEYS/URETERS: There is a right renal cyst. The kidneys otherwise enhance symmetrically. There is no urinary tract obstruction.    BLADDER: Diffusely thick-walled.  REPRODUCTIVE ORGANS: Uterus is unremarkable. There is a 2.7 cm complicated cyst of the right ovary.    BOWEL: No bowel obstruction. Appendix is absent. Dilated fluid-filled small bowel loops are noted measuring up to 3.1 cm. There is associated wall thickening and mesenteric edema, which may indicate vascular compromise. There appears to be a transition of caliber at a small bowel anastomosis. Distal small bowel loops are relatively of normal caliber. Stool and air seen throughout the colon.  PERITONEUM: Small fluid is seen within the cul-de-sac. Stranding is seen along the anterior pelvis.  VESSELS: Within normal limits.  RETROPERITONEUM/LYMPH NODES: No lymphadenopathy.  ABDOMINAL WALL: Post surgical changes compatible with recent .  BONES: Within normal limits.    IMPRESSION:  Findings compatible with early or partial small bowel obstruction, with site of transition at the small bowel anastomosis, at the same location as previously noted high-grade small bowel obstruction.    Pelvic fluid and stranding which may be related to patient's recent , though may also be related to bowel obstruction as above or other adjacent pathology.    Thick-walled urinary bladder which may represent cystitis. Correlate with urinalysis.    Decreasing size of complex right ovarian cyst. This is likely a hemorrhagic cyst. Sonographic follow-up can be obtained in 2-3 months to assess resolution. If this does persist, an endometrioma may be favored.

## 2023-07-28 NOTE — ED ADULT NURSE NOTE - OBJECTIVE STATEMENT
Assumed pt care @2300 in ST. Pt received A&Ox4 c/o epigastric pain associated with NV and fevers since Saturday. Recently discharged from [facility] for same issues. No active vomiting @ this time. Respirations even & unlabored. NAD. Abd soft non distended, tender upon palp. Pt given call bell and instructed on how to properly use system. Pt instructed to use for further assistance. Pt made aware of plan of care and verbalized understanding.

## 2023-07-28 NOTE — ED PROVIDER NOTE - PHYSICAL EXAMINATION
Gen: uncomfortable appearing, actively vomiting, nontoxic-appearing  Head: NCAT  HEENT: oral mucosa moist, normal conjunctiva, oropharynx clear without exudate or erythema  Lung: CTAB, no respiratory distress, no wheezing, rales, rhonchi  CV: normal s1/s2, rrr, no murmurs, Normal perfusion, pulses 2+ throughout  Abd: soft,  diffusely tender to palpation, nondistended, no guarding or rigidity  MSK: No edema, no visible deformities, full range of motion in all 4 extremities  Neuro: CN II-XII grossly intact, No focal neurologic deficits  Skin: No rash   Psych: normal affect

## 2023-07-28 NOTE — ED PROVIDER NOTE - OBJECTIVE STATEMENT
33-year-old female with past medical history of  in May, complicated by wound infection and sepsis, as well as appendix and small bowel clot and closure, recent admission for SBO and discharge 2 days ago presenting with nausea, vomiting, and abdominal pain. No fevers or chills.  Patient states that she is passing stool and flatus, last bowel movement today.

## 2023-07-28 NOTE — ED ADULT NURSE NOTE - NSFALLUNIVINTERV_ED_ALL_ED
Bed/Stretcher in lowest position, wheels locked, appropriate side rails in place/Call bell, personal items and telephone in reach/Instruct patient to call for assistance before getting out of bed/chair/stretcher/Non-slip footwear applied when patient is off stretcher/Josephine to call system/Physically safe environment - no spills, clutter or unnecessary equipment/Purposeful proactive rounding/Room/bathroom lighting operational, light cord in reach

## 2023-07-28 NOTE — ED ADULT TRIAGE NOTE - CHIEF COMPLAINT QUOTE
patient c/o "swollen intestines and 5 episodes of vomiting today." states she was d/c home wednesday for same. no fevers, diarrhea, gu symptoms noted.

## 2023-07-29 DIAGNOSIS — K56.600 PARTIAL INTESTINAL OBSTRUCTION, UNSPECIFIED AS TO CAUSE: ICD-10-CM

## 2023-07-29 LAB
ALBUMIN SERPL ELPH-MCNC: 4.5 G/DL — SIGNIFICANT CHANGE UP (ref 3.3–5.2)
ALP SERPL-CCNC: 93 U/L — SIGNIFICANT CHANGE UP (ref 40–120)
ALT FLD-CCNC: 13 U/L — SIGNIFICANT CHANGE UP
ANION GAP SERPL CALC-SCNC: 14 MMOL/L — SIGNIFICANT CHANGE UP (ref 5–17)
AST SERPL-CCNC: 16 U/L — SIGNIFICANT CHANGE UP
BILIRUB SERPL-MCNC: 0.6 MG/DL — SIGNIFICANT CHANGE UP (ref 0.4–2)
BUN SERPL-MCNC: 9.7 MG/DL — SIGNIFICANT CHANGE UP (ref 8–20)
CALCIUM SERPL-MCNC: 9.7 MG/DL — SIGNIFICANT CHANGE UP (ref 8.4–10.5)
CHLORIDE SERPL-SCNC: 100 MMOL/L — SIGNIFICANT CHANGE UP (ref 96–108)
CO2 SERPL-SCNC: 26 MMOL/L — SIGNIFICANT CHANGE UP (ref 22–29)
CREAT SERPL-MCNC: 0.75 MG/DL — SIGNIFICANT CHANGE UP (ref 0.5–1.3)
EGFR: 108 ML/MIN/1.73M2 — SIGNIFICANT CHANGE UP
GLUCOSE SERPL-MCNC: 101 MG/DL — HIGH (ref 70–99)
HCG SERPL-ACNC: <4 MIU/ML — SIGNIFICANT CHANGE UP
POTASSIUM SERPL-MCNC: 4.3 MMOL/L — SIGNIFICANT CHANGE UP (ref 3.5–5.3)
POTASSIUM SERPL-SCNC: 4.3 MMOL/L — SIGNIFICANT CHANGE UP (ref 3.5–5.3)
PROT SERPL-MCNC: 7.9 G/DL — SIGNIFICANT CHANGE UP (ref 6.6–8.7)
SODIUM SERPL-SCNC: 140 MMOL/L — SIGNIFICANT CHANGE UP (ref 135–145)

## 2023-07-29 PROCEDURE — G1004: CPT

## 2023-07-29 PROCEDURE — 74177 CT ABD & PELVIS W/CONTRAST: CPT | Mod: 26,MG

## 2023-07-29 PROCEDURE — 99231 SBSQ HOSP IP/OBS SF/LOW 25: CPT | Mod: GC

## 2023-07-29 RX ORDER — SODIUM CHLORIDE 9 MG/ML
1000 INJECTION, SOLUTION INTRAVENOUS
Refills: 0 | Status: DISCONTINUED | OUTPATIENT
Start: 2023-07-29 | End: 2023-07-31

## 2023-07-29 RX ORDER — ONDANSETRON 8 MG/1
8 TABLET, FILM COATED ORAL ONCE
Refills: 0 | Status: COMPLETED | OUTPATIENT
Start: 2023-07-29 | End: 2023-07-29

## 2023-07-29 RX ORDER — ENOXAPARIN SODIUM 100 MG/ML
40 INJECTION SUBCUTANEOUS EVERY 12 HOURS
Refills: 0 | Status: DISCONTINUED | OUTPATIENT
Start: 2023-07-29 | End: 2023-07-31

## 2023-07-29 RX ORDER — MORPHINE SULFATE 50 MG/1
4 CAPSULE, EXTENDED RELEASE ORAL ONCE
Refills: 0 | Status: DISCONTINUED | OUTPATIENT
Start: 2023-07-29 | End: 2023-07-29

## 2023-07-29 RX ORDER — ONDANSETRON 8 MG/1
4 TABLET, FILM COATED ORAL ONCE
Refills: 0 | Status: COMPLETED | OUTPATIENT
Start: 2023-07-29 | End: 2023-07-29

## 2023-07-29 RX ORDER — MORPHINE SULFATE 50 MG/1
6 CAPSULE, EXTENDED RELEASE ORAL ONCE
Refills: 0 | Status: DISCONTINUED | OUTPATIENT
Start: 2023-07-29 | End: 2023-07-29

## 2023-07-29 RX ADMIN — MORPHINE SULFATE 4 MILLIGRAM(S): 50 CAPSULE, EXTENDED RELEASE ORAL at 01:22

## 2023-07-29 RX ADMIN — ONDANSETRON 4 MILLIGRAM(S): 8 TABLET, FILM COATED ORAL at 01:22

## 2023-07-29 RX ADMIN — MORPHINE SULFATE 6 MILLIGRAM(S): 50 CAPSULE, EXTENDED RELEASE ORAL at 09:55

## 2023-07-29 RX ADMIN — SODIUM CHLORIDE 120 MILLILITER(S): 9 INJECTION, SOLUTION INTRAVENOUS at 12:46

## 2023-07-29 RX ADMIN — ONDANSETRON 8 MILLIGRAM(S): 8 TABLET, FILM COATED ORAL at 09:21

## 2023-07-29 RX ADMIN — SODIUM CHLORIDE 120 MILLILITER(S): 9 INJECTION, SOLUTION INTRAVENOUS at 18:45

## 2023-07-29 RX ADMIN — ENOXAPARIN SODIUM 40 MILLIGRAM(S): 100 INJECTION SUBCUTANEOUS at 18:45

## 2023-07-29 RX ADMIN — MORPHINE SULFATE 6 MILLIGRAM(S): 50 CAPSULE, EXTENDED RELEASE ORAL at 09:18

## 2023-07-29 NOTE — H&P ADULT - HISTORY OF PRESENT ILLNESS
HPI:  33 year old F, 33F with PMH/PSH  who presented on 7/3/23 with wound infection and sepsis, found to have appendix and small bowel caught in closure, recently admitted with N/V and was diagnosed with a SBO, resolved with non-surgical management, represents today with abdominal pain associated with nausea and vomiting, she is passing flatus and stool, no fever or chills, no additional complaints.      PAST MEDICAL HISTORY:  Fibroids    PAST SURGICAL HISTORY:  H/O  section    History of appendectomy    History of resection of small bowel    ALLERGIES:  No Known Allergies    FAMILY HISTORY: Noncontributory    SOCIAL HISTORY: Denies tobacco, EtOH, illicit substance use.     HOME MEDICATIONS:    MEDICATIONS  (STANDING):    MEDICATIONS  (PRN):    VITALS & I/Os:  Vital Signs Last 24 Hrs  T(C): 36.9 (2023 21:42), Max: 36.9 (2023 21:42)  T(F): 98.5 (2023 21:42), Max: 98.5 (2023 21:42)  HR: 66 (2023 21:42) (66 - 66)  BP: 132/89 (2023 21:42) (132/89 - 132/89)  BP(mean): --  RR: 18 (2023 21:42) (18 - 18)  SpO2: 100% (2023 21:42) (100% - 100%)    Parameters below as of 2023 21:42  Patient On (Oxygen Delivery Method): room air    CAPILLARY BLOOD GLUCOSE    I&O's Summary    GENERAL: Alert, well developed, in no acute distress.  RESPIRATORY: Nonlabored   CARDIOVASCULAR: Normotensive   GASTROINTESTINAL: Abdomen soft, tender in epigastric region, ND, -R/-G      LABS:                        11.5   10.11 )-----------( 529      ( 2023 23:01 )             36.2     Lactate:      07-28 @ 23:01  1.10    IMAGING:  FINDINGS:  LOWER CHEST: Within normal limits.    LIVER: Within normal limits.  BILE DUCTS: Normal caliber.  GALLBLADDER: Within normal limits.  SPLEEN: Within normal limits.  PANCREAS: Within normal limits.  ADRENALS: Within normal limits.  KIDNEYS/URETERS: There is a right renal cyst. The kidneys otherwise enhance symmetrically. There is no urinary tract obstruction.    BLADDER: Diffusely thick-walled.  REPRODUCTIVE ORGANS: Uterus is unremarkable. There is a 2.7 cm complicated cyst of the right ovary.    BOWEL: No bowel obstruction. Appendix is absent. Dilated fluid-filled small bowel loops are noted measuring up to 3.1 cm. There is associated wall thickening and mesenteric edema, which may indicate vascular compromise. There appears to be a transition of caliber at a small bowel anastomosis. Distal small bowel loops are relatively of normal caliber. Stool and air seen throughout the colon.  PERITONEUM: Small fluid is seen within the cul-de-sac. Stranding is seen along the anterior pelvis.  VESSELS: Within normal limits.  RETROPERITONEUM/LYMPH NODES: No lymphadenopathy.  ABDOMINAL WALL: Post surgical changes compatible with recent .  BONES: Within normal limits.    IMPRESSION:  Findings compatible with early or partial small bowel obstruction, with site of transition at the small bowel anastomosis, at the same location as previously noted high-grade small bowel obstruction.    Pelvic fluid and stranding which may be related to patient's recent , though may also be related to bowel obstruction as above or other adjacent pathology.    Thick-walled urinary bladder which may represent cystitis. Correlate with urinalysis.    Decreasing size of complex right ovarian cyst. This is likely a hemorrhagic cyst. Sonographic follow-up can be obtained in 2-3 months to assess resolution. If this does persist, an endometrioma may be favored.

## 2023-07-29 NOTE — H&P ADULT - ASSESSMENT
33 year old F, 33F with PMH/PSH  who presented on 7/3/23 with wound infection and sepsis, found to have appendix and small bowel caught in closure, recently admitted with N/V and was diagnosed with a SBO, resolved with non-surgical management, represents today with abdominal pain associated with nausea and vomiting, she is not clinically obstructed.  Her work up showed normal lab values, a CT Findings compatible with early or partial small bowel obstruction, with site of transition at the small bowel anastomosis, at the same location as previously noted high-grade small bowel obstruction, again she is not clinically obstructed    Recommendations:   -admit to surgery  -NPO/IVF  -possibly restart diet  if abdominal pain resolves  -passing flatus and BM, continue to monitor bowel function  -pain control  -strict Is/Os  -continue home meds  -trend labs, replete electrolytes as needed  -encourage OOB  -incentive spirometry  -DVT ppx: SCDs, Lovenox 40 BIDA

## 2023-07-29 NOTE — H&P ADULT - ATTENDING COMMENTS
Agree with above assessment.  The patient was seen and examined by myself with the surgical PA and resident. The patient is with mild mid abdominal discomfort, no nausea or vomit at present.  The patient is with mild tenderness to palpation of the mid epigastric area, no guarding, no rebound.  Will admit for observation, keep NPO for now, IV hydration.  If persists with pain then will order Gastrografin study.  Original CT scan is without any appreciable contrast. Otherwise if no abdominal pain, nausea, or vomit then will start PO.

## 2023-07-29 NOTE — ED ADULT NURSE REASSESSMENT NOTE - NS ED NURSE REASSESS COMMENT FT1
assumed care from previous RN. pt a&ox4, RR even and unlabored, skin warm and dry. pt reports intermittent epigastric pain but declines pain medication.

## 2023-07-29 NOTE — PATIENT PROFILE ADULT - FUNCTIONAL ASSESSMENT - DAILY ACTIVITY 3.
4 = No assist / stand by assistance Topical Clindamycin Counseling: Patient counseled that this medication may cause skin irritation or allergic reactions.  In the event of skin irritation, the patient was advised to reduce the amount of the drug applied or use it less frequently.   The patient verbalized understanding of the proper use and possible adverse effects of clindamycin.  All of the patient's questions and concerns were addressed.

## 2023-07-30 LAB
ANION GAP SERPL CALC-SCNC: 12 MMOL/L — SIGNIFICANT CHANGE UP (ref 5–17)
BUN SERPL-MCNC: 7 MG/DL — LOW (ref 8–20)
CALCIUM SERPL-MCNC: 8.7 MG/DL — SIGNIFICANT CHANGE UP (ref 8.4–10.5)
CHLORIDE SERPL-SCNC: 103 MMOL/L — SIGNIFICANT CHANGE UP (ref 96–108)
CO2 SERPL-SCNC: 25 MMOL/L — SIGNIFICANT CHANGE UP (ref 22–29)
CREAT SERPL-MCNC: 0.59 MG/DL — SIGNIFICANT CHANGE UP (ref 0.5–1.3)
EGFR: 122 ML/MIN/1.73M2 — SIGNIFICANT CHANGE UP
GLUCOSE SERPL-MCNC: 76 MG/DL — SIGNIFICANT CHANGE UP (ref 70–99)
HCT VFR BLD CALC: 32.6 % — LOW (ref 34.5–45)
HGB BLD-MCNC: 10.3 G/DL — LOW (ref 11.5–15.5)
MAGNESIUM SERPL-MCNC: 1.7 MG/DL — SIGNIFICANT CHANGE UP (ref 1.6–2.6)
MCHC RBC-ENTMCNC: 27.3 PG — SIGNIFICANT CHANGE UP (ref 27–34)
MCHC RBC-ENTMCNC: 31.6 GM/DL — LOW (ref 32–36)
MCV RBC AUTO: 86.5 FL — SIGNIFICANT CHANGE UP (ref 80–100)
PHOSPHATE SERPL-MCNC: 3.9 MG/DL — SIGNIFICANT CHANGE UP (ref 2.4–4.7)
PLATELET # BLD AUTO: 374 K/UL — SIGNIFICANT CHANGE UP (ref 150–400)
POTASSIUM SERPL-MCNC: 4.4 MMOL/L — SIGNIFICANT CHANGE UP (ref 3.5–5.3)
POTASSIUM SERPL-SCNC: 4.4 MMOL/L — SIGNIFICANT CHANGE UP (ref 3.5–5.3)
RBC # BLD: 3.77 M/UL — LOW (ref 3.8–5.2)
RBC # FLD: 15.3 % — HIGH (ref 10.3–14.5)
SODIUM SERPL-SCNC: 140 MMOL/L — SIGNIFICANT CHANGE UP (ref 135–145)
WBC # BLD: 4.78 K/UL — SIGNIFICANT CHANGE UP (ref 3.8–10.5)
WBC # FLD AUTO: 4.78 K/UL — SIGNIFICANT CHANGE UP (ref 3.8–10.5)

## 2023-07-30 PROCEDURE — 99231 SBSQ HOSP IP/OBS SF/LOW 25: CPT

## 2023-07-30 RX ADMIN — ENOXAPARIN SODIUM 40 MILLIGRAM(S): 100 INJECTION SUBCUTANEOUS at 05:43

## 2023-07-30 RX ADMIN — SODIUM CHLORIDE 120 MILLILITER(S): 9 INJECTION, SOLUTION INTRAVENOUS at 21:01

## 2023-07-30 RX ADMIN — ENOXAPARIN SODIUM 40 MILLIGRAM(S): 100 INJECTION SUBCUTANEOUS at 17:40

## 2023-07-30 NOTE — PROGRESS NOTE ADULT - NS ATTEND AMEND GEN_ALL_CORE FT
Agree with above assessment.  The patient was seen and examined by myself with the surgical PA.  The patient is without abdominal pain, nausea, or vomit. Abdomen is soft without localizing tenderness, guarding, or rebound.  Will start PO clears. If no further pain then advance slowly as tolerated.

## 2023-07-31 ENCOUNTER — TRANSCRIPTION ENCOUNTER (OUTPATIENT)
Age: 33
End: 2023-07-31

## 2023-07-31 VITALS
SYSTOLIC BLOOD PRESSURE: 114 MMHG | RESPIRATION RATE: 16 BRPM | DIASTOLIC BLOOD PRESSURE: 84 MMHG | OXYGEN SATURATION: 99 % | TEMPERATURE: 98 F | HEART RATE: 56 BPM

## 2023-07-31 PROCEDURE — 99232 SBSQ HOSP IP/OBS MODERATE 35: CPT

## 2023-07-31 PROCEDURE — 85025 COMPLETE CBC W/AUTO DIFF WBC: CPT

## 2023-07-31 PROCEDURE — 84100 ASSAY OF PHOSPHORUS: CPT

## 2023-07-31 PROCEDURE — 82435 ASSAY OF BLOOD CHLORIDE: CPT

## 2023-07-31 PROCEDURE — 84295 ASSAY OF SERUM SODIUM: CPT

## 2023-07-31 PROCEDURE — T1013: CPT

## 2023-07-31 PROCEDURE — 82330 ASSAY OF CALCIUM: CPT

## 2023-07-31 PROCEDURE — 80053 COMPREHEN METABOLIC PANEL: CPT

## 2023-07-31 PROCEDURE — 96375 TX/PRO/DX INJ NEW DRUG ADDON: CPT

## 2023-07-31 PROCEDURE — 83735 ASSAY OF MAGNESIUM: CPT

## 2023-07-31 PROCEDURE — 74177 CT ABD & PELVIS W/CONTRAST: CPT | Mod: MG

## 2023-07-31 PROCEDURE — 99285 EMERGENCY DEPT VISIT HI MDM: CPT | Mod: 25

## 2023-07-31 PROCEDURE — 96374 THER/PROPH/DIAG INJ IV PUSH: CPT

## 2023-07-31 PROCEDURE — 82803 BLOOD GASES ANY COMBINATION: CPT

## 2023-07-31 PROCEDURE — 85018 HEMOGLOBIN: CPT

## 2023-07-31 PROCEDURE — 83690 ASSAY OF LIPASE: CPT

## 2023-07-31 PROCEDURE — 83605 ASSAY OF LACTIC ACID: CPT

## 2023-07-31 PROCEDURE — 84132 ASSAY OF SERUM POTASSIUM: CPT

## 2023-07-31 PROCEDURE — 36415 COLL VENOUS BLD VENIPUNCTURE: CPT

## 2023-07-31 PROCEDURE — G1004: CPT

## 2023-07-31 PROCEDURE — 82947 ASSAY GLUCOSE BLOOD QUANT: CPT

## 2023-07-31 PROCEDURE — 84702 CHORIONIC GONADOTROPIN TEST: CPT

## 2023-07-31 PROCEDURE — 85027 COMPLETE CBC AUTOMATED: CPT

## 2023-07-31 PROCEDURE — 85014 HEMATOCRIT: CPT

## 2023-07-31 PROCEDURE — 96376 TX/PRO/DX INJ SAME DRUG ADON: CPT

## 2023-07-31 PROCEDURE — 80048 BASIC METABOLIC PNL TOTAL CA: CPT

## 2023-07-31 RX ADMIN — Medication 10 MILLIGRAM(S): at 11:39

## 2023-07-31 RX ADMIN — ENOXAPARIN SODIUM 40 MILLIGRAM(S): 100 INJECTION SUBCUTANEOUS at 06:08

## 2023-07-31 RX ADMIN — SODIUM CHLORIDE 120 MILLILITER(S): 9 INJECTION, SOLUTION INTRAVENOUS at 06:10

## 2023-07-31 NOTE — PROGRESS NOTE ADULT - NS ATTEND AMEND GEN_ALL_CORE FT
I have seen and examined this patient with the surgical team.  No acute events overnight.  Patient appears well this morning.  Denies abdominal pain, denies nausea/vomiting.  Endorses flatus, denies BMs.  Will ADAT to regular.  Will give Dulcolax MI.  DC today if tolerating diet.

## 2023-07-31 NOTE — DISCHARGE NOTE NURSING/CASE MANAGEMENT/SOCIAL WORK - NSDCVIVACCINE_GEN_ALL_CORE_FT
MMR; 21-May-2023 15:31; Aminata Brock (RN); [employer] [id number] (Exp. Date: 07-Jan-2024); SubCutaneous; Arm Left; 0.5 milliLiter(s); VIS (VIS Published: 20-Apr-2012, VIS Presented: 21-May-2023);

## 2023-07-31 NOTE — DISCHARGE NOTE PROVIDER - NSFOLLOWUPCLINICS_GEN_ALL_ED_FT
[facility] Acute Care Surgery  Acute Care Surgery  [address] [place] [id number]  Phone: [phone]  Fax:   Follow Up Time: 2 weeks

## 2023-07-31 NOTE — DISCHARGE NOTE NURSING/CASE MANAGEMENT/SOCIAL WORK - PATIENT PORTAL LINK FT
You can access the FollowMyHealth Patient Portal offered by [facility] by registering at the following website: [email or web address]. By joining [employer]s FollowMyHealth portal, you will also be able to view your health information using other applications (apps) compatible with our system.

## 2023-07-31 NOTE — PROGRESS NOTE ADULT - SUBJECTIVE AND OBJECTIVE BOX
Patient seen and examined at bedside. No acute events overnight, minimal abdominal pain, continues to pass flatus    Vitals:  Vital Signs Last 24 Hrs  T(C): 36.7 (29 Jul 2023 22:54), Max: 36.9 (29 Jul 2023 06:54)  T(F): 98 (29 Jul 2023 22:54), Max: 98.4 (29 Jul 2023 06:54)  HR: 64 (29 Jul 2023 22:54) (56 - 66)  BP: 122/82 (29 Jul 2023 22:54) (96/66 - 125/73)  BP(mean): --  RR: 18 (29 Jul 2023 22:54) (17 - 18)  SpO2: 98% (29 Jul 2023 22:54) (98% - 99%)    Parameters below as of 29 Jul 2023 22:54  Patient On (Oxygen Delivery Method): room air        Labs:  07-28    140  |  100  |  9.7  ----------------------------<  101<H>  4.3   |  26.0  |  0.75    Ca    9.7      28 Jul 2023 23:01    TPro  7.9  /  Alb  4.5  /  TBili  0.6  /  DBili  x   /  AST  16  /  ALT  13  /  AlkPhos  93  07-28                            11.5   10.11 )-----------( 529      ( 28 Jul 2023 23:01 )             36.2       Exam:  Gen: pt lying in bed, alert, in NAD  Resp: unlabored  CVS: RRR  Abd: soft, ND, minimal epigastric tenderness, pfannenstiel incision healing well without drainage  Ext: moving all extremities spontaneously, sensation intact, pulses 2+ 
SUBJECTIVE/24 hour events:  Patient is a 33yFemale s/p ex-lap on 7/3 via pfannestiel appendectomy with sbr, well known to our service, recently discharged with SBO that resolved with conservative management now returns 1 week later with sbox2. Patient with no acute events overnight, passing gas, voiding, no abdominal pain, no nausea and or vomiting, tolerating sips and chips.  had questions regarding SBO and how to prevent them, extensive conversation had at the bedside, patient and family verbalized and understood what was explained       Vital Signs Last 24 Hrs  T(C): 36.9 (30 Jul 2023 23:22), Max: 36.9 (30 Jul 2023 23:22)  T(F): 98.4 (30 Jul 2023 23:22), Max: 98.4 (30 Jul 2023 23:22)  HR: 62 (30 Jul 2023 23:22) (58 - 66)  BP: 129/81 (30 Jul 2023 23:22) (104/72 - 129/82)  BP(mean): --  RR: 18 (30 Jul 2023 23:22) (18 - 18)  SpO2: 99% (30 Jul 2023 23:22) (97% - 100%)    Parameters below as of 30 Jul 2023 23:22  Patient On (Oxygen Delivery Method): room air      Drug Dosing Weight  Height (cm): 152.4 (28 Jul 2023 21:42)  Weight (kg): 84.8 (28 Jul 2023 21:42)  BMI (kg/m2): 36.5 (28 Jul 2023 21:42)  BSA (m2): 1.81 (28 Jul 2023 21:42)  I&O's Detail    29 Jul 2023 07:01  -  30 Jul 2023 07:00  --------------------------------------------------------  IN:    Lactated Ringers: 120 mL  Total IN: 120 mL    OUT:  Total OUT: 0 mL    Total NET: 120 mL      30 Jul 2023 07:01  -  31 Jul 2023 01:50  --------------------------------------------------------  IN:    Lactated Ringers: 960 mL  Total IN: 960 mL    OUT:  Total OUT: 0 mL    Total NET: 960 mL        Allergies    No Known Allergies    Intolerances                              10.3   4.78  )-----------( 374      ( 30 Jul 2023 07:20 )             32.6   07-30    140  |  103  |  7.0<L>  ----------------------------<  76  4.4   |  25.0  |  0.59    Ca    8.7      30 Jul 2023 07:20  Phos  3.9     07-30  Mg     1.7     07-30        ROS:    PHYSICAL EXAM:  Constitutional: in good spirits   Respiratory: no respiratory distress, no dyspnea, no supplemental o2 needed  Gastrointestinal: abdomen softly distended, non-tender, no guarding, + flatus   Genitourinary: voiding  Neurological: A&OX3          MEDICATIONS  (STANDING):  enoxaparin Injectable 40 milliGRAM(s) SubCutaneous every 12 hours  lactated ringers. 1000 milliLiter(s) (120 mL/Hr) IV Continuous <Continuous>    MEDICATIONS  (PRN):      RADIOLOGY STUDIES:    CULTURES:

## 2023-07-31 NOTE — DISCHARGE NOTE PROVIDER - NSDCCONDITION_GEN_ALL_CORE
Katt Grace, Geisinger St. Luke's [facility] Endocrinology and Diabetes   [place] location   Fax: [phone]  Phone: [phone]  I have been to that location before     Stable

## 2023-07-31 NOTE — DISCHARGE NOTE PROVIDER - HOSPITAL COURSE
HPI:  33 year old F, 33F with PMH/PSH  who presented on 7/3/23 with wound infection and sepsis, found to have appendix and small bowel caught in closure, recently admitted with N/V and was diagnosed with a SBO, resolved with non-surgical management, represents today with abdominal pain associated with nausea and vomiting, she is passing flatus and stool, no fever or chills, no additional complaints.      Hospital Course:  Patient admitted to the acute care surgery service with signs/symptoms of partial small bowel obstruction, also shown on CT Scan. Patient continued to pass flatus. Patient was conservatively managed. Patient's pain subsided, diet advanced and tolerated, and able to perform expected activities of daily living.     Length of time preparing discharge > 30 minutes

## 2023-07-31 NOTE — DISCHARGE NOTE PROVIDER - NSDCCPCAREPLAN_GEN_ALL_CORE_FT
PRINCIPAL DISCHARGE DIAGNOSIS  Diagnosis: Partial small bowel obstruction  Assessment and Plan of Treatment: Regular diet as tolerated. Return to ED if unable to tolerate diet, recurrent abdominal pain, fevers. Please follow up with your primary care physician regarding your hospitalization. Follow up with acute care surgery x 2 weeks in clinic. Please call for appointment.

## 2023-07-31 NOTE — DISCHARGE NOTE NURSING/CASE MANAGEMENT/SOCIAL WORK - NSDCPEFALRISK_GEN_ALL_CORE
For information on Fall & Injury Prevention, visit: [email or web address] OR  [email or web address] OR  https://www.cdc.gov/steadi/patient.html

## 2023-07-31 NOTE — PROGRESS NOTE ADULT - ASSESSMENT
33 year old F, 33F with PMH/PSH  who presented on 7/3/23 with wound infection and sepsis, found to have appendix and small bowel caught in closure, recently admitted with N/V and was diagnosed with a SBO, resolved with non-surgical management, represents today with abdominal pain associated with nausea and vomiting, she is not clinically obstructed.  Her work up showed normal lab values, a CT Findings compatible with early or partial small bowel obstruction, with site of transition at the small bowel anastomosis, at the same location as previously noted high-grade small bowel obstruction, again she is not clinically obstructed    Recommendations:   -NPO/IVF  -possibly restart diet if abdominal pain resolves  -poss GG study if continues abdominal pain  -passing flatus and BM, continue to monitor bowel function  -pain control  -strict Is/Os  -continue home meds  -trend labs, replete electrolytes as needed  -encourage OOB  -incentive spirometry  -DVT ppx: SCDs, Lovenox 40 BID  
33 year old F, 33F with PMH/PSH  who presented on 7/3/23 with wound infection and sepsis, found to have appendix and small bowel caught in closure, recently admitted with N/V and was diagnosed with a SBO, resolved with non-surgical management, represents today with abdominal pain associated with nausea and vomiting, she is not clinically obstructed.  Her work up showed normal lab values, a CT Findings compatible with early or partial small bowel obstruction, with site of transition at the small bowel anastomosis, at the same location as previously noted high-grade small bowel obstruction, again she is not clinically obstructed    Recommendations:   -sips and chips/IVF, advance today ?  -poss GG study if continues abdominal pain  -passing flatus and BM, continue to monitor bowel function  -pain control  -strict Is/Os  -continue home meds  -trend labs, replete electrolytes as needed  -encourage OOB  -incentive spirometry  -DVT ppx: SCDs, Lovenox 40 BID

## 2023-08-03 ENCOUNTER — APPOINTMENT (OUTPATIENT)
Dept: TRAUMA SURGERY | Facility: CLINIC | Age: 33
End: 2023-08-03
Payer: MEDICAID

## 2023-08-03 VITALS
SYSTOLIC BLOOD PRESSURE: 94 MMHG | DIASTOLIC BLOOD PRESSURE: 66 MMHG | RESPIRATION RATE: 16 BRPM | OXYGEN SATURATION: 99 % | TEMPERATURE: 98.5 F | HEART RATE: 64 BPM | HEIGHT: 61.5 IN | BODY MASS INDEX: 33.18 KG/M2 | WEIGHT: 178 LBS

## 2023-08-03 PROCEDURE — 99024 POSTOP FOLLOW-UP VISIT: CPT

## 2023-08-03 NOTE — REASON FOR VISIT
[Post Hospitalization] : a post hospitalization visit [Source: ______] : History obtained from [unfilled] Island Pedicle Flap-Requiring Vessel Identification Text: The defect edges were debeveled with a #15 scalpel blade.  Given the location of the defect, shape of the defect and the proximity to free margins an island pedicle advancement flap was deemed most appropriate.  Using a sterile surgical marker, an appropriate advancement flap was drawn, based on the axial vessel mentioned above, incorporating the defect, outlining the appropriate donor tissue and placing the expected incisions within the relaxed skin tension lines where possible.    The area thus outlined was incised deep to adipose tissue with a #15 scalpel blade.  The skin margins were undermined to an appropriate distance in all directions around the primary defect and laterally outward around the island pedicle utilizing iris scissors.  There was minimal undermining beneath the pedicle flap.

## 2023-08-03 NOTE — HISTORY OF PRESENT ILLNESS
[FreeTextEntry1] :  Hospital Course:  Discharge Date	2023  Admission Date	2023 03:51  Reason for Admission	abdominal wall abscess  Hospital Course	  Patient is a 34 y/o otherwise healthy female who recently gave birth via   on  presenting with increasing lower abdominal pain, swelling and  erythema. Initial labs and imaging concerning for an abdominal wall abscess.  The CT also showed evidence of acute, ruptured appendicitis with extension into  the rectus muscle. She was taken to the OR on 7/3 for an open appendectomy and  exploration. Intraoperatively a serosal injury was noted to a section of small  bowel which was adherent to the abdominal wall along with her uterus. Her  appendix was also noted to be perforated, consistent with her CT imaging. Her  appendix was removed without complication. An 8 cm section of small bowel was  resected and a new anastomoses was formed. She tolerated the surgery well.  Given her abdominal wall infection the wound was left open to heal via  secondary intention and a wound vac was placed. She did well on POD 1-3 with  reassuring physical exams, vitals and labs. She was set up with home vac  services prior to her discharge.   Patient presents  to [facility]  clinic  for  post op exam   and  wound  check  No wound vac at  this  time  Patient  has returned  to  [facility] for abdominal pain and  SBO since last office  visit   Patient  denies  any  fevers  no chills no n/v/d    Daily BMs and passing  gas   Still with  occasional  abdominal pain  due  to incision

## 2023-08-03 NOTE — PHYSICAL EXAM
[Normal Breath Sounds] : Normal breath sounds [Normal Heart Sounds] : normal heart sounds [Abdomen Tenderness] : ~T ~M No abdominal tenderness [No Rash or Lesion] : No rash or lesion [Purpura] : no purpura  [Petechiae] : no petechiae [Skin Ulcer] : no ulcer [Skin Induration] : no induration [Alert] : alert [Oriented to Person] : oriented to person [Oriented to Place] : oriented to place [Oriented to Time] : oriented to time [Calm] : calm [de-identified] : wdwn  female  in  nad [de-identified] : non icteric sclera PERRLA EOMS intact  and  nl [de-identified] : trachea midline [de-identified] : soft    no distension    lower abdominal incision site c/d/i  no active  drainage  no purulent discharge  and no active  bleeding   no  odor  no  signs  of  cellulitis     no  formation of  seroma    abdominal wall soft no rigidity  no guarding  no  rebound non tender to palpation at  this  time [de-identified] : see abdomen exam

## 2023-08-03 NOTE — ASSESSMENT
[FreeTextEntry1] : RTC prn  F/u GI - patient has made appt for  this  month F/u OB/Gyn Maintain regular healthy bowel movements Any abdominal pain or decrease in BM,fevers go directly to [facility] ED Discussion with patient   All questions answered  Any acute symptoms and or concerns patient understands  to call back office  and  or  go  directly to [facility] ED

## 2023-08-03 NOTE — REVIEW OF SYSTEMS
[Abdominal Pain] : abdominal pain [Skin Wound] : skin wound [Negative] : Psychiatric [FreeTextEntry7] :  /ex lap/open appendectomy/SBO [de-identified] : surgical incision site

## 2023-08-10 NOTE — OB RN DELIVERY SUMMARY - NS_MECONIUTRACHA_OBGYN_ALL_OB
None Please return to the ER if you develop any concerning symptoms.  You tested positive for COVID-19 today.  Please rest and drink plenty of fluids.  You can take Tylenol or ibuprofen as needed and as prescribed for fevers.    COVID-19  COVID-19, or coronavirus disease 2019, is an infection that is caused by a new (novel) coronavirus called SARS-CoV-2. COVID-19 can cause many symptoms. In some people, the virus may not cause any symptoms. In others, it may cause mild or severe symptoms. Some people with severe infection develop severe disease.    What are the causes?  The human body, showing how the coronavirus travels from the air to a person's lungs.  This illness is caused by a virus. The virus may be in the air as tiny specks of fluid (aerosols) or droplets, or it may be on surfaces. You may catch the virus by:  Breathing in droplets from an infected person. Droplets can be spread by a person breathing, speaking, singing, coughing, or sneezing.  Touching something, like a table or a doorknob, that has virus on it (is contaminated) and then touching your mouth, nose, or eyes.  What increases the risk?  Risk for infection:    You are more likely to get infected with the COVID-19 virus if:  You are within 6 ft (1.8 m) of a person with COVID-19 for 15 minutes or longer.  You are providing care for a person who is infected with COVID-19.  You are in close personal contact with other people. Close personal contact includes hugging, kissing, or sharing eating or drinking utensils.  Risk for serious illness caused by COVID-19:    You are more likely to get seriously ill from the COVID-19 virus if:  You have cancer.  You have a long-term (chronic) disease, such as:  Chronic lung disease. This includes pulmonary embolism, chronic obstructive pulmonary disease, and cystic fibrosis.  Long-term disease that lowers your body's ability to fight infection (immunocompromise).  Serious cardiac conditions, such as heart failure, coronary artery disease, or cardiomyopathy.  Diabetes.  Chronic kidney disease.  Liver diseases. These include cirrhosis, nonalcoholic fatty liver disease, alcoholic liver disease, or autoimmune hepatitis.  You have obesity.  You are pregnant or were recently pregnant.  You have sickle cell disease.  What are the signs or symptoms?  Symptoms of this condition can range from mild to severe. Symptoms may appear any time from 2 to 14 days after being exposed to the virus. They include:  Fever or chills.  Shortness of breath or trouble breathing.  Feeling tired or very tired.  Headaches, body aches, or muscle aches.  Runny or stuffy nose, sneezing, coughing, or sore throat.  New loss of taste or smell. This is rare.  Some people may also have stomach problems, such as nausea, vomiting, or diarrhea.    Other people may not have any symptoms of COVID-19.    How is this diagnosed?  A sample being collected by swabbing the nose.  This condition may be diagnosed by testing samples to check for the COVID-19 virus. The most common tests are the PCR test and the antigen test. Tests may be done in the lab or at home. They include:  Using a swab to take a sample of fluid from the back of your nose and throat (nasopharyngeal fluid), from your nose, or from your throat.  Testing a sample of saliva from your mouth.  Testing a sample of coughed-up mucus from your lungs (sputum).  How is this treated?  Treatment for COVID-19 infection depends on the severity of the condition.  Mild symptoms can be managed at home with rest, fluids, and over-the-counter medicines.  Serious symptoms may be treated in a hospital intensive care unit (ICU). Treatment in the ICU may include:  Supplemental oxygen. Extra oxygen is given through a tube in the nose, a face mask, or a urbina.  Medicines. These may include:  Antivirals, such as monoclonal antibodies. These help your body fight off certain viruses that can cause disease.  Anti-inflammatories, such as corticosteroids. These reduce inflammation and suppress the immune system.  Antithrombotics. These prevent or treat blood clots, if they develop.  Convalescent plasma. This helps boost your immune system, if you have an underlying immunosuppressive condition or are getting immunosuppressive treatments.  Prone positioning. This means you will lie on your stomach. This helps oxygen to get into your lungs.  Infection control measures.  If you are at risk for more serious illness caused by COVID-19, your health care provider may prescribe two long-acting monoclonal antibodies, given together every 6 months.    How is this prevented?  To protect yourself:    Use preventive medicine (pre-exposure prophylaxis). You may get pre-exposure prophylaxis if you have moderate or severe immunocompromise.  Get vaccinated. Anyone 6 months old or older who meets guidelines can get a COVID-19 vaccine or vaccine series. This includes people who are pregnant or making breast milk (lactating).  Get an added dose of COVID-19 vaccine after your first vaccine or vaccine series if you have moderate to severe immunocompromise. This applies if you have had a solid organ transplant or have been diagnosed with an immunocompromising condition.  You should get the added dose 4 weeks after you got the first COVID-19 vaccine or vaccine series.  If you get an mRNA vaccine, you will need a 3-dose primary series.  If you get the J&J/Gallo vaccine, you will need a 2-dose primary series, with the second dose being an mRNA vaccine.  Talk to your health care provider about getting experimental monoclonal antibodies. This treatment is approved under emergency use authorization to prevent severe illness before or after being exposed to the COVID-19 virus. You may be given monoclonal antibodies if:  You have moderate or severe immunocompromise. This includes treatments that lower your immune response. People with immunocompromise may not develop protection against COVID-19 when they are vaccinated.  You cannot be vaccinated. You may not get a vaccine if you have a severe allergic reaction to the vaccine or its components.  You are not fully vaccinated.  You are in a facility where COVID-19 is present and:  Are in close contact with a person who is infected with the COVID-19 virus.  Are at high risk of being exposed to the COVID-19 virus.  You are at risk of illness from new variants of the COVID-19 virus.  To protect others:    If you have symptoms of COVID-19, take steps to prevent the virus from spreading to others.  Stay home. Leave your house only to get medical care. Do not use public transit, if possible.  Do not travel while you are sick.  Wash your hands often with soap and water for at least 20 seconds. If soap and water are not available, use alcohol-based hand .  Make sure that all people in your household wash their hands well and often.  Cough or sneeze into a tissue or your sleeve or elbow. Do not cough or sneeze into your hand or into the air.  Where to find more information  Centers for Disease Control and Prevention: www.cdc.gov/coronavirus  World Health Organization: www.who.int/health-topics/coronavirus  Get help right away if:  You have trouble breathing.  You have pain or pressure in your chest.  You are confused.  You have bluish lips and fingernails.  You have trouble waking from sleep.  You have symptoms that get worse.  These symptoms may be an emergency. Get help right away. Call 911.  Do not wait to see if the symptoms will go away.  Do not drive yourself to the hospital.  Summary  COVID-19 is an infection that is caused by a new coronavirus.  Sometimes, there are no symptoms. Other times, symptoms range from mild to severe. Some people with a severe COVID-19 infection develop severe disease.  The virus that causes COVID-19 can spread from person to person through droplets or aerosols from breathing, speaking, singing, coughing, or sneezing.  Mild symptoms of COVID-19 can be managed at home with rest, fluids, and over-the-counter medicines.  This information is not intended to replace advice given to you by your health care provider. Make sure you discuss any questions you have with your health care provider.

## 2023-08-11 NOTE — CDI QUERY NOTE - NSCDIOTHERTXTBX_GEN_ALL_CORE_HH
SUPPORTING DOCUMENTATION / EVIDENCE:  Presented with abd pain  x 4 days in upper midline which has worsened in last day. Also with N/V  Found to have SBO, treated conservatively  Recent  (May) and open appendectomy with small bowel resection 1 months ago  Attending states "concern for abstruction 2/2 stricture given recent surgery"  ED:  Progress: PA NAIN, patient reports no BM X 1 day, pain improving 10/10 pain, pain comes and goes rates it as 2/10->9/10.  Patient reports has yet to be evaluated by surgery for a definitive plan.  She was recently admitted for 4 days for similar symptoms, was treated conservatively for partial SBO.  She reports once discharged pain continued Wed/Thur/Fri she notes vomiting caused her pain to subside.  Patient notes vomiting yesterday, everything that she ate that did not help the pain.  May 28 ha , July 3 also had surgery to remove appendix, and had infection to the bowel had piece removed.    DX:	Partial small bowel obstruction.    H&P:  HPI: PMH/PSH  who presented on 7/3/23 with wound infection and sepsis, found to have appendix and small bowel caught in closure, now presenting with abdominal pain, nausea, and vomiting. Patient reports 4 days of intermittent abdominal pain in the upper midline which has worsened and become persistent over the past day.    A/P: Partial SBO. Likely due to edema at anastomosis. Good candidate for trial of conservative management.    -Admit to [facility]     CT:  < from: CT Abdomen and Pelvis w/ IV Cont (23 @ 05:07) >  IMPRESSION:  Findings compatible with high-grade small bowel obstruction. Transition   point identified at the level of the surgical anastomosis in mid abdomen   (35:5). Long segmental wall thickening of jejunal folds proximal to the   transition point with mesenteric edema and interloop fluid. No   pneumatosis or portal venous gas. Correlate with lactic acid.    ATTENDING  NOTE:   s/p C section and subsequent re-opening of incision for appendectomy and SBR now presents with 4 days of abdominal pain, now persistent and associated with N/V. No leukocytosis or elevated lactate on admission, CT concerning for SBO, however patient is currently reporting bowel function.    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 SUPPORTING DOCUMENTATION / EVIDENCE:  Presented with abd pain  x 4 days in upper midline which has worsened in last day. Also with N/V  Found to have SBO, treated conservatively  Recent  (May) and open appendectomy with small bowel resection 1 months ago  Attending states "concern for abstruction 2/2 stricture given recent surgery"  ED:  Progress: PA NAIN, patient reports no BM X 1 day, pain improving 10/10 pain, pain comes and goes rates it as 2/10->9/10.  Patient reports has yet to be evaluated by surgery for a definitive plan.  She was recently admitted for 4 days for similar symptoms, was treated conservatively for partial SBO.  She reports once discharged pain continued Wed/Thur/Fri she notes vomiting caused her pain to subside.  Patient notes vomiting yesterday, everything that she ate that did not help the pain.  May 28 ha , July 3 also had surgery to remove appendix, and had infection to the bowel had piece removed.    DX:	Partial small bowel obstruction.    H&P:  HPI: PMH/PSH  who presented on 7/3/23 with wound infection and sepsis, found to have appendix and small bowel caught in closure, now presenting with abdominal pain, nausea, and vomiting. Patient reports 4 days of intermittent abdominal pain in the upper midline which has worsened and become persistent over the past day.    A/P: Partial SBO. Likely due to edema at anastomosis. Good candidate for trial of conservative management.    -Admit to [facility]     CT:  < from: CT Abdomen and Pelvis w/ IV Cont (23 @ 05:07) >  IMPRESSION:  Findings compatible with high-grade small bowel obstruction. Transition   point identified at the level of the surgical anastomosis in mid abdomen   (35:5). Long segmental wall thickening of jejunal folds proximal to the   transition point with mesenteric edema and interloop fluid. No   pneumatosis or portal venous gas. Correlate with lactic acid.    ATTENDING  NOTE:   s/p C section and subsequent re-opening of incision for appendectomy and SBR now presents with 4 days of abdominal pain, now persistent and associated with N/V. No leukocytosis or elevated lactate on admission, CT concerning for SBO, however patient is currently reporting bowel function.    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

## 2023-08-24 ENCOUNTER — APPOINTMENT (OUTPATIENT)
Dept: TRAUMA SURGERY | Facility: CLINIC | Age: 33
End: 2023-08-24
Payer: MEDICAID

## 2023-08-24 VITALS
BODY MASS INDEX: 32.24 KG/M2 | OXYGEN SATURATION: 96 % | DIASTOLIC BLOOD PRESSURE: 70 MMHG | WEIGHT: 173 LBS | SYSTOLIC BLOOD PRESSURE: 102 MMHG | TEMPERATURE: 98.1 F | HEART RATE: 76 BPM | RESPIRATION RATE: 16 BRPM | HEIGHT: 61.5 IN

## 2023-08-24 DIAGNOSIS — T81.43XA INFECTION FOLLOWING A PROCEDURE, ORGAN AND SPACE SURGICAL SITE, INITIAL ENCTR: ICD-10-CM

## 2023-08-24 DIAGNOSIS — K37 UNSPECIFIED APPENDICITIS: ICD-10-CM

## 2023-08-24 DIAGNOSIS — Z87.898 PERSONAL HISTORY OF OTHER SPECIFIED CONDITIONS: ICD-10-CM

## 2023-08-24 DIAGNOSIS — Z90.49 ACQUIRED ABSENCE OF OTHER SPECIFIED PARTS OF DIGESTIVE TRACT: ICD-10-CM

## 2023-08-24 DIAGNOSIS — Z98.890 OTHER SPECIFIED POSTPROCEDURAL STATES: ICD-10-CM

## 2023-08-24 DIAGNOSIS — G89.18 OTHER ACUTE POSTPROCEDURAL PAIN: ICD-10-CM

## 2023-08-24 PROCEDURE — 99024 POSTOP FOLLOW-UP VISIT: CPT

## 2023-08-25 PROBLEM — Z87.898 H/O ABDOMINAL ABSCESS: Status: ACTIVE | Noted: 2023-07-19

## 2023-08-25 PROBLEM — T81.43XA POSTOPERATIVE INTRA-ABDOMINAL ABSCESS: Status: ACTIVE | Noted: 2023-07-19

## 2023-08-25 PROBLEM — G89.18 POSTOPERATIVE PAIN: Status: ACTIVE | Noted: 2023-05-30

## 2023-08-25 PROBLEM — K37 APPENDICITIS: Status: ACTIVE | Noted: 2023-07-19

## 2023-08-25 PROBLEM — Z90.49 STATUS POST SMALL BOWEL RESECTION: Status: ACTIVE | Noted: 2023-07-19

## 2023-08-25 PROBLEM — Z98.890 STATUS POST EXPLORATORY LAPAROTOMY: Status: ACTIVE | Noted: 2023-07-19

## 2023-08-25 NOTE — ASSESSMENT
[FreeTextEntry1] : RTC prn  F/u OB/Gyn Maintain regular healthy bowel movements Any abdominal pain or decrease in BM,fevers go directly to [facility] ED Discussion with patient   All questions answered  Any acute symptoms and or concerns patient understands  to call back office  and  or  go  directly to [facility] ED Patient given last  OP report  to  show  employer

## 2023-08-25 NOTE — REVIEW OF SYSTEMS
[Abdominal Pain] : abdominal pain [Skin Wound] : skin wound [Negative] : Psychiatric [FreeTextEntry7] :  /ex lap/open appendectomy/SBO [de-identified] : surgical incision site

## 2023-08-25 NOTE — PHYSICAL EXAM
[Normal Breath Sounds] : Normal breath sounds [Normal Heart Sounds] : normal heart sounds [Abdomen Tenderness] : ~T ~M No abdominal tenderness [No Rash or Lesion] : No rash or lesion [Purpura] : no purpura  [Petechiae] : no petechiae [Skin Ulcer] : no ulcer [Skin Induration] : no induration [Alert] : alert [Oriented to Person] : oriented to person [Oriented to Place] : oriented to place [Oriented to Time] : oriented to time [Calm] : calm [de-identified] : wdwn  female  in  nad [de-identified] : non icteric sclera PERRLA EOMS intact  and  nl [de-identified] : trachea midline [de-identified] : soft    no distension    lower abdominal incision site c/d/i  no active  drainage  no purulent discharge  and no active  bleeding   no  odor  no  signs  of  cellulitis     no  formation of  seroma    abdominal wall soft no rigidity  no guarding  no  rebound non tender to palpation at  this  time [de-identified] : see abdomen exam

## 2023-08-25 NOTE — REASON FOR VISIT
[Post Hospitalization] : a post hospitalization visit [Spouse] : spouse [Source: ______] : History obtained from [unfilled]

## 2024-02-01 NOTE — ED ADULT TRIAGE NOTE - INTERNATIONAL TRAVEL
".I recommend the book, "The Obesity Code" for weight loss; it recommends intermittent fasting and avoidance of sugar, artificial sweeteners and refined carbohydrates.    Also, here is information on a Mediterranean type diet including fish, the pesco-mediterranean diet from the American College of Cardiology:    1.  Humans are evolutionarily adapted to obtain calories and nutrients from both plant and animal food sources. Many people overconsume animal products, often-processed meats high in saturated fats and chemical additives. In contrast, while strict veganism has gained popularity for many reasons and has value in certain groups, it can cause nutritional deficiencies (vitamin B12, high-quality proteins, iron, zinc, omega-3 fatty acid, vitamin D, and calcium), and predispose to osteopenia, loss of muscle mass, and anemia. This is not true of a lacto-ovo vegetarian diet, which allows no animal-based food except for eggs and dairy. A 6-year study of [id number] [place] [employer] reported a decreased incidence of all-cause mortality when comparing vegetarians with nonvegetarians. However, when the vegetarians were stratified into vegans, lacto-ovo vegetarians, pesco-vegetarians, and semi-vegetarians, the pesco-vegetarians had lowest risks for all-cause mortality, cardiovascular disease (CVD) mortality, and mortality from other causes.     2.  The authors propose a plant-rich diet rich in nuts with fish and seafood as the principle source of animal food. Known as the Pesco-Mediterranean diet, it is supplemented with extra-virgin olive oil (EVOO), which is the principle fat source, along with moderate amounts of dairy (particularly yogurt and cheese) and eggs, as well as modest amounts of alcohol consumption (ideally red wine with the evening meal), but few red and processed meats.     3.  Both epidemiological studies and randomized clinical trials indicate that the traditional Mediterranean diet is associated " with lower risks for all-cause and CVD mortality, coronary heart disease, metabolic syndrome, diabetes, cognitive decline, neurodegenerative diseases (including Alzheimers), depression, overall cancer mortality, and breast and colorectal cancers.     4.  The traditional Mediterranean diet has been endorsed in the most recent Dietary Guidelines for Americans and the American College of Cardiology/American Heart Association guidelines. The 2020 U.S. News & World Report ranked it #1 for overall health based upon it being nutritious, safe, relatively easy to follow, protective against CVD and diabetes, and effective for weight loss.     5.  Fish and seafood are important sources of vitamins protein and omega-3 fatty acids, of which the higher blood and adipose tissue are associated with reduced fatal and nonfatal myocardial infarction. When not fried, fish consumption has been associated with reduced risk of heart failure, and the incidence of the metabolic syndrome, coronary heart disease, ischemic stroke, and sudden cardiac death, particularly when seafood replaces less healthy foods.     6.  Unrestricted use of olive oil in the kitchen, on salads (with vinegar), cooking vegetables, and at the table is the foundation of the traditional Mediterranean diet, although olive oil quality is crucial, which makes it expensive. EVOO retains hydrophilic components of olives including highly bioactive polyphenols, which are believed to underlie many of EVOOs cardiometabolic benefits, such as reduced low-density lipoprotein cholesterol (LDL-C) and increased high-density lipoprotein cholesterol (HDL-C), improved vascular reactivity, enhanced HDL particle functionality, and a lower diabetes risk.     7.  Tree nuts, an integral component of the traditional Mediterranean diet, are nutrient dense rich in unsaturated fats, fiber, protein, polyphenols, phytosterols, and tocopherols. Nut consumption is associated with decreased  incidence and mortality rates from both CVD and coronary artery disease (CAD), as well as atrial fibrillation and diabetes. Randomized controlled trials have shown that diets enriched with nuts produce cardiometabolic benefits including improvements in insulin sensitivity, LDL-C, inflammation, and vascular reactivity. A 1-daily serving of mixed nuts resulted in a 28% reduction in CVD risk. Generous intake of nuts does not promote weight gain because of increased satiety and incomplete digestion.     8.  Legumes are an excellent source of vegetable protein, folate, and magnesium and fiber, and like other seeds including peanuts, are rich in polyphenols. Consumption of legumes has been linked to a reduced risk of incident and fatal CVD and CAD, as well as improvements in blood glucose, cholesterol, blood pressure, and body weight. Legumes, like fish, are a satiating and healthy substitute for red meat and processed meats.     9.  Dairy products and eggs are important sources of protein, nonsodium minerals, probiotics, and vitamin D. Although there is no clear consensus among nutrition experts on the role of dairy products in CVD risk, they are allowed in this Pesco-Mediterranean diet. Fermented low-fat versions, such as yogurt and soft cheeses, are preferred; butter and hard cheese are high in saturated fats and salt.     10.  Eggs are composed of beneficial nutrients including all essential amino acids, in addition to minerals (selenium, phosphorus, iodine, zinc), vitamins (A, D, B2, B12, niacin), and carotenoids (lutein, zeaxanthin). Although each yolk contains about 184 mg of dietary cholesterol, large prospective cohorts suggest that egg consumption is unrelated to serum cholesterol and does not increase CVD risk. Eggs are allowed in the Pesco-Mediterranean diet; egg whites are unlimited and preferably no more than 5 yolks/week.     11.  Whole grains, such as barley, whole oats, rye, corn, buckwheat, brown rice,  and quinoa, are an integral part of the traditional Mediterranean diet. Pasta is an example of a starchy food that has a low glycemic index despite being a refined carbohydrate. In the context of a low glycemic index dietary pattern such as the Mediterranean diet, pasta does not adversely affect adiposity and may even help reduce body weight and there is no evidence that pasta promotes cardiometabolic risk factors. White rice is associated with type 2 diabetes mellitus in Asians but not in Western cohorts, possibly because it is cooked and served plain in [place] and in Western cultures cooked in mixed dishes with vegetables and vegetable oil including EVOO.     12.  The staple beverage of the Pesco-Mediterranean diet is water--which can be flavored but not sweetened. Unsweetened tea and coffee are rich in antioxidants and are associated with improved CVD outcomes. If alcohol is consumed at all, dry red wine is recommended, with the ideal amount being a single glass (6 oz) for women and 1 or 2 glasses/day for men (6-12 oz) consumed with meals.     13.  Time-restricted eating, a type of intermittent fasting, is the practice of limiting the daily intake of calories to a window of time usually between 6-12 hours each day. Intermittent fasting when done on a regular basis has been shown to decrease intra-abdominal adipose tissue and reduce free-radical production. This elicits powerful cellular responses that improve glucose metabolism and reduce systemic inflammation, and may also reduce risks of diabetes, CVD, cancer, and neurodegenerative diseases. After a 12-hour overnight fast, insulin levels are typically low, and glycogen stores have been depleted. In this fasted state, the body starts mobilizing fatty acids from adipose cells to burn as metabolic fuel instead of glucose. This improves insulin sensitivity. Time-restricted eating is not more effective for weight loss than standard calorie-restriction, but appears to  enhance CV health even in nonobese people. Fasting may also lower blood pressure and resting heart rate and improve autonomic balance with augmented heart rate variability.     14.  The evidence regarding time-restricted eating is mostly based on animal models and observational human studies. The most popular form of time-restricted eating involves eating two rather than three meals and compressing the calorie-consumption window. No head-to-head studies have been performed to assess the optimal time window.     No

## 2024-03-08 NOTE — OB PROVIDER DELIVERY SUMMARY - NS_[id number]_OBGYN_ALL_OB
Left an message regarding tim's note to pt.   
OMER CALLING CRITICAL VALUE, INR 2.2 FROM 03/06/2024  
Yes

## 2024-06-26 NOTE — PATIENT PROFILE ADULT - VISION (WITH CORRECTIVE LENSES IF THE PATIENT USUALLY WEARS THEM):
[facility]   Physical Therapy Treatment Note      Name: Jorge Becerra  Clinic Number: [id number]    Therapy Diagnosis:   Encounter Diagnosis   Name Primary?    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  Evaluation Date: 6/6/2024  Authorization Period Expiration: 5/30/25  Plan of Care Expiration: 8/23/24  Progress Note Due: 7/5/24  Date of Surgery: 2021  Visit # / Visits authorized: 3/ 12   FOTO: 1/ 3     Precautions: Standard, Diabetes, and cancer      Time In: 1555  Time Out: 1640  Total Billable Time: 40 minutes     PTA Visit #: 0/5     Subjective     Patient reports: no c/o pain.  He was compliant with home exercise program.  Response to previous treatment: positive.  Functional change: no    Pain: 0/10  Location: left thigh.     Objective      Objective Measures updated at progress report unless specified.     Treatment     Jorge received the treatments listed below:      therapeutic exercises to develop strength, endurance, ROM, flexibility, posture, and core stabilization for 10 minutes including:  Bike 10'.    manual therapy techniques: dry needling were applied to the:  for 0 minutes, including:    neuromuscular re-education activities to improve: Balance, Coordination, Kinesthetic, Sense, Proprioception, and Posture for 30 minutes. The following activities were included:  HS stretch 3x30  Piriformis stretch 3x30.  // bars  Mini squats 30  HR/TR 30  Gastroc stretch 3x30,  DF-100 22# FL;3X10, EXT;3X10  EFX 3'.  Shuttle 43# 6'  therapeutic activities to improve functional performance for 0  minutes, including:      gait training to improve functional mobility and safety for 0  minutes, including:      direct contact modalities after being cleared for contraindications:     supervised modalities after being cleared for contradictions: TENS:  Jorge  received TENS electrical stimulation for pain to the . Pt received continuous mode at a rate of 2 pps for 0 minutes. Jorge tolerated treatment well without any adverse effects.     Patient Education and Home Exercises       Education provided:   Gait pattern ed.  - Posture ed.    Written Home Exercises Provided:  to be issued .   Assessment     Endurance improving.  Performed well.    Jorge Is progressing well towards his goals.   Patient prognosis is Good.     Patient will continue to benefit from skilled outpatient physical therapy to address the deficits listed in the problem list box on initial evaluation, provide pt/family education and to maximize pt's level of independence in the home and community environment.     Patient's spiritual, cultural and educational needs considered and pt agreeable to plan of care and goals.     Anticipated barriers to physical therapy: no    Goals:   SHORT TERM GOALS:  3 weeks  Progress Date met   Recent signs and systems trend is improving in order to progress towards Long term goals.  [] Met  [] Not Met  [x] Progressing     Patient will be independent with Home Exercise Program  in order to further progress and return to maximal function. [] Met  [] Not Met  [x] Progressing     Pain rating at Worst: 5 /10 in order to progress towards increased independence with activity. [] Met  [] Not Met  [x] Progressing     Patient will be able to correct postural deviations in sitting and standing, to decrease pain and promote postural awareness for injury prevention.  [] Met  [] Not Met  [x] Progressing     Patient will improve functional outcome (FOTO) score: by 5% to increase self-worth & perceived functional ability towards long term goals [] Met  [] Not Met  [x] Progressing        LONG TERM GOALS: 6 weeks  Progress Date met   Patient will return to normal activites of daily living, recreational, and work related activities with less pain and limitation.  [] Met  [] Not Met  [x]  Progressing     Patient will improve range of motion  to stated goals in order to return to maximal functional potential. ROM of right hip WNL/WFL in all planes. [] Met  [] Not Met  [x] Progressing     Patient will improve Strength to stated goals of appropriate musculature in order to improve functional independence. Strength of right hip and knee 5/5 in all planes. [] Met  [] Not Met  [x] Progressing     Pain Rating at Best: 1/10 to improve Quality of Life.  [] Met  [] Not Met  [x] Progressing     Patient will meet predicted functional outcome (FOTO) score: 45% to increase self-worth & perceived functional ability. [] Met  [] Not Met  [x] Progressing     Patient will have met/partially met personal goal of: return to previous LOF. [] Met  [] Not Met  [x] Progressing           Plan     Per POC.Progress as able.    Moe Sebastian, PT             Normal vision: sees adequately in most situations; can see medication labels, newsprint

## 2025-01-30 NOTE — DISCHARGE NOTE NURSING/CASE MANAGEMENT/SOCIAL WORK - NSDCPEFALRISK_GEN_ALL_CORE
Authorized referral faxed to Dr Coy Mohan at [phone] via Right Fax. Fax Confirmation received.   Language [employer] [place]ag[place]   #  [phone] Barbie Oh:  Left detailed voicemail that referral was faxed to Dr Mohan and he can call his office to schedule an appointment.     For information on Fall & Injury Prevention, visit: [email or web address] OR  [email or web address] OR  https://www.cdc.gov/steadi/patient.html

## 2025-03-31 NOTE — PATIENT PROFILE ADULT - NSPROGENOTHERPROVIDER_GEN_A_NUR
Chief Complaint   Patient presents with    Well Child     1. Have you been to the ER, urgent care clinic since your last visit?  Hospitalized since your last visit?No    2. Have you seen or consulted any other health care providers outside of the [facility] since your last visit?  Include any pap smears or colon screening. No    Vitals:    03/31/25 1053   BP: (!) 88/42   Pulse: 92   Resp: 24   Temp: 98.2 °F (36.8 °C)   TempSrc: Axillary   SpO2: 99%   Weight: 23.7 kg (52 lb 3.2 oz)   Height: 1.13 m (3' 8.49\")         none

## (undated) DEVICE — ELCTR BOVIE BLADE 3/4" EXTENDED LENGTH 6"

## (undated) DEVICE — VENODYNE/SCD SLEEVE CALF MEDIUM

## (undated) DEVICE — SUT VICRYL 2-0 54" REEL UNDYED

## (undated) DEVICE — PACK MAJOR ABDOMINAL WITH LAP

## (undated) DEVICE — Device

## (undated) DEVICE — SUT MONOCRYL 4-0 27" PS-2 UNDYED

## (undated) DEVICE — STAPLER COVIDIEN ENDO GIA STANDARD HANDLE

## (undated) DEVICE — WARMING BLANKET UPPER ADULT

## (undated) DEVICE — SUT PDS II 2-0 27" CT-1

## (undated) DEVICE — DRAPE 1/2 SHEET 40X57"

## (undated) DEVICE — SUT PERMAHAND 3-0 REEL

## (undated) DEVICE — SYR CONTROL LUER LOK 10CC

## (undated) DEVICE — FOLEY TRAY 16FR LF URINE METER SURESTEP

## (undated) DEVICE — PACK MINOR WITH LAP

## (undated) DEVICE — SUT VICRYL 0 36" CT-1 UNDYED

## (undated) DEVICE — LIGASURE IMPACT